# Patient Record
Sex: MALE | Race: WHITE | Employment: FULL TIME | URBAN - METROPOLITAN AREA
[De-identification: names, ages, dates, MRNs, and addresses within clinical notes are randomized per-mention and may not be internally consistent; named-entity substitution may affect disease eponyms.]

---

## 2022-12-21 ENCOUNTER — TELEPHONE (OUTPATIENT)
Dept: BEHAVIORAL HEALTH | Facility: CLINIC | Age: 42
End: 2022-12-21

## 2022-12-23 ENCOUNTER — TELEPHONE (OUTPATIENT)
Dept: BEHAVIORAL HEALTH | Facility: CLINIC | Age: 42
End: 2022-12-23

## 2022-12-23 ENCOUNTER — HOSPITAL ENCOUNTER (OUTPATIENT)
Dept: BEHAVIORAL HEALTH | Facility: CLINIC | Age: 42
Discharge: HOME | End: 2022-12-23
Attending: FAMILY MEDICINE | Admitting: FAMILY MEDICINE
Payer: COMMERCIAL

## 2022-12-23 VITALS — BODY MASS INDEX: 27.2 KG/M2 | WEIGHT: 190 LBS | HEIGHT: 70 IN

## 2022-12-23 DIAGNOSIS — F41.1 GAD (GENERALIZED ANXIETY DISORDER): Primary | ICD-10-CM

## 2022-12-23 PROBLEM — F10.20 ALCOHOL DEPENDENCE (H): Status: ACTIVE | Noted: 2022-09-05

## 2022-12-23 PROBLEM — K85.90 ACUTE PANCREATITIS: Status: ACTIVE | Noted: 2022-03-02

## 2022-12-23 PROCEDURE — H0001 ALCOHOL AND/OR DRUG ASSESS: HCPCS | Mod: GT,95

## 2022-12-23 RX ORDER — TRAZODONE HYDROCHLORIDE 50 MG/1
1 TABLET, FILM COATED ORAL AT BEDTIME
COMMUNITY
Start: 2022-03-04

## 2022-12-23 RX ORDER — HYDROXYZINE HYDROCHLORIDE 10 MG/1
TABLET, FILM COATED ORAL
COMMUNITY
Start: 2022-12-20

## 2022-12-23 RX ORDER — GABAPENTIN 100 MG/1
CAPSULE ORAL
COMMUNITY
Start: 2022-12-20

## 2022-12-23 ASSESSMENT — PAIN SCALES - GENERAL: PAINLEVEL: NO PAIN (0)

## 2022-12-23 ASSESSMENT — COLUMBIA-SUICIDE SEVERITY RATING SCALE - C-SSRS
3. HAVE YOU BEEN THINKING ABOUT HOW YOU MIGHT KILL YOURSELF?: NO
4. HAVE YOU HAD THESE THOUGHTS AND HAD SOME INTENTION OF ACTING ON THEM?: NO
6. HAVE YOU EVER DONE ANYTHING, STARTED TO DO ANYTHING, OR PREPARED TO DO ANYTHING TO END YOUR LIFE?: YES
5. HAVE YOU STARTED TO WORK OUT OR WORKED OUT THE DETAILS OF HOW TO KILL YOURSELF? DO YOU INTEND TO CARRY OUT THIS PLAN?: NO
1. IN THE PAST MONTH, HAVE YOU WISHED YOU WERE DEAD OR WISHED YOU COULD GO TO SLEEP AND NOT WAKE UP?: NO
2. HAVE YOU ACTUALLY HAD ANY THOUGHTS OF KILLING YOURSELF IN THE PAST MONTH?: NO

## 2022-12-23 NOTE — TELEPHONE ENCOUNTER
Patient have a video appointment today at 8am with [facility]. Writer placed a call this morning to check in. Unable to get a hold of patient. Writer's call went to patient's voicemail. Patient's voicemail box is full. Writer could not leave a voicemail behind.

## 2022-12-23 NOTE — TELEPHONE ENCOUNTER
The below telephone note was routed to the [facility] UR department at: [facility]PAT[facility] UR [[id number]] and to the [facility] IOP Admissions Department at: [facility] ADULT IOP INTAKE POOL [[id number]] on 12/23/2022 as a referral for IOP treatment at [facility].    A.)  Name: Nicko Perez Tel #: [phone]  B.)  MRN: [id number]  C.)  Referral is for: a virtual or hybrid Evening Outpatient program at one of the [facility] locations for co-occurring mental health and substance use disorder treatment.  D.)  Notes: Patient has [facility]    Thanks,    ELVIS Echevarria

## 2022-12-23 NOTE — ADDENDUM NOTE
Encounter addended by: Megan Dumont [facility] on: 12/23/2022 9:57 AM   Actions taken: Visit diagnoses modified, Order list changed, Diagnosis association updated

## 2022-12-23 NOTE — PATIENT INSTRUCTIONS
Recommendations:     1. Plan for Safety and Risk Management:  Recommended that patient call 911 or go to the local ED should there be a change in any of these risk factors..      Report to child / adult protection services was NA.     2. STACY Recommendations:  Patient meets criteria for residential treatment, but is unable to complete that level of care at this time due to financial concerns and being able to meet financial payments to enter treatment at this time.  Patient is also wanting to return to work so that his home does not go into [facility].  For these reasons, the following recommendations are being made and patient reports they are willing to follow these recommendations.  Patient would like the following family or other support people involved in their treatment:  None. Patient does not have a history of opiate use.    1)  Complete an Intensive Outpatient CD Treatment Program, by a provider of your choice.   2)  Abstain from all mood-altering chemicals unless prescribed by a licensed provider.    3)  Attend, at minimum, 2 weekly support group meetings, such as 12 step based (AA/NA), SMART Recovery, Health Realizations,  and/or Refuge Recovery meetings.      4)  Actively work with a male mentor/sponsor on a weekly basis.    5)  Follow all the recommendations of your treatment/medical providers.   6)  Remain law abiding and follow all recommendations of the Courts/PO.   7)  Patient may benefit from obtaining a mental health evaluation and connection to individual providers.   8)  Should you struggle with cravings, meet with your Primary Care Physician and discuss anti-craving medication.    3. Mental Health Referrals:  Patient would benefit from a mental health evaluation, and a referral was made to schedule for patient.     4. Clinical Substantiation/medical necessity for the above recommendations:  Met with patient individually on 12/23/22 for a comprehensive assessment including summary of assessment  and conclusion, assessment risk and appropriate steps taken, documentation to support the diagnosis, rationale for disposition and appropriate level of care recommended and alternative for treatment options.  Patient stated he missed some work due to relapsing on alcohol and he was honest with his employer.  Patient said he needs to complete an evaluation and follow the recommendations to return to work.  Patient recently completed a detox at [address] [place] [address] and said he has not had anything to drink since.  Patient has struggled with alcohol use for the past 10 years and has had periods of sobriety, but continues to relapse due to maladaptive coping skills.  Patient has not engaged in mental health services in the past and is interested in a referral for mental health supports as well as sober supportive services.    Rational for recommended level of care: The patient lacks long-term sober maintenance skills, lacks sober coping skills, lacks awareness regarding the disease model of addiction, lacks a sober peer support network and has medical issues which are exacerbated by substance abuse.    Placement/Program Barriers Identified: None identified.    Referral:   [facility] - Intensive Outpatient Program  Mental Health & Addiction Services  Intake Phone: [phone]  Fax: [phone]  Substance Use ([email or web address])    [facility] Evening Outpatient (Virtual) M, W, TH 6:00pm-9:00pm    [facility]patient (Virtual) M, T,W,TH  5:30-7:30pm    [facility] Evening Outpatient (Hybrid, virtual and in-person) M, T, W 5:30-7:30 and Th  5:30-8:30   Co Occurring Day Outpatient at [facility] (M/W/TH 9:30-12:30)   Day Outpatient at [facility] (M/W/TH 9-noon) which is OP taking Medicare*              [facility] (M/T/W 12-2) which is OP taking Medicare*              [facility] (T/TH 12:30-3:30) [facility] accepts Medicare*    5. Patient's identified no cultural considerations for treatment programming.     6.  Recommendations for treatment focus:   Anxiety - See #3 above.  Alcohol / Substance Use - See #2 above.     7.  Interactive Complexity: No

## 2022-12-23 NOTE — PROGRESS NOTES
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 regarding the disease model of addiction, lacks a sober peer support network and has medical issues which are exacerbated by substance abuse.    Placement/Program Barriers Identified: None identified.    Referral:   [facility] - Intensive Outpatient Program  Mental Health & Addiction Services  Intake Phone: [phone]  Fax: [phone]  Substance Use ([email or web address])    [facility] Evening Outpatient (Virtual) M, W, TH 6:00pm-9:00pm    [place] Evening Outpatient (Virtual) M, T,W,TH  5:30-7:30pm    [place] Evening Outpatient (Hybrid, virtual and in-person) M, T, W 5:30-7:30 and Th  5:30-8:30   Co Occurring Day Outpatient at [facility] (M/W/TH 9:30-12:30)   Day Outpatient at [facility] (M/W/TH 9-noon) which is OP taking Medicare*               [facility] (M/T/W 12-2) which is OP taking Medicare*               [facility] (T/TH 12:30-3:30) [facility] accepts Medicare*    5. Patient's identified no cultural considerations for treatment programming.     6. Recommendations for treatment focus:   Anxiety - See #3 above.  Alcohol / Substance Use - See #2 above.     7.  Interactive Complexity: No     DAANES Assessment ID: [id number]    Provider Name/ Credentials:  Megan Dumont MA, [facility] [place]  December 23, 2022

## 2022-12-23 NOTE — TELEPHONE ENCOUNTER
Writer placed a second call at 7:48am to check in patient for video appointment at 8am. Unable to get a hold of patient. Patient's voicemail box is full. Writer could not leave a voicemail behind. Writer will inform patient's .

## 2022-12-23 NOTE — TELEPHONE ENCOUNTER
Nicko Perez Release of Information requests were e-mailed to the [place] [address] OB's at [email or web address] on 12/23/2022 with requests for the following releases:    Patient's e-mail address: [email or web address]    1.) STACY - [id number] - Exchange of MH & STACY Records  [facility]  Marita Liz, [phone]

## 2022-12-29 ENCOUNTER — HOSPITAL ENCOUNTER (OUTPATIENT)
Dept: BEHAVIORAL HEALTH | Facility: CLINIC | Age: 42
Discharge: HOME | End: 2022-12-29
Attending: FAMILY MEDICINE
Payer: COMMERCIAL

## 2022-12-29 PROCEDURE — H2035 A/D TX PROGRAM, PER HOUR: HCPCS | Mod: HQ,GT,95

## 2022-12-29 PROCEDURE — H2035 A/D TX PROGRAM, PER HOUR: HCPCS | Mod: GT,95

## 2022-12-29 ASSESSMENT — ANXIETY QUESTIONNAIRES
IF YOU CHECKED OFF ANY PROBLEMS ON THIS QUESTIONNAIRE, HOW DIFFICULT HAVE THESE PROBLEMS MADE IT FOR YOU TO DO YOUR WORK, TAKE CARE OF THINGS AT HOME, OR GET ALONG WITH OTHER PEOPLE: NOT DIFFICULT AT ALL
5. BEING SO RESTLESS THAT IT IS HARD TO SIT STILL: SEVERAL DAYS
4. TROUBLE RELAXING: NOT AT ALL
6. BECOMING EASILY ANNOYED OR IRRITABLE: NOT AT ALL
2. NOT BEING ABLE TO STOP OR CONTROL WORRYING: SEVERAL DAYS
GAD7 TOTAL SCORE: 4
3. WORRYING TOO MUCH ABOUT DIFFERENT THINGS: NOT AT ALL
1. FEELING NERVOUS, ANXIOUS, OR ON EDGE: MORE THAN HALF THE DAYS
7. FEELING AFRAID AS IF SOMETHING AWFUL MIGHT HAPPEN: NOT AT ALL
GAD7 TOTAL SCORE: 4

## 2022-12-29 NOTE — PROGRESS NOTES
"Client:  Nicko Perez  MRN: [id number]    Comprehensive Assessment UPDATE/[facility]/[place] Re-Assess   Comprehensive Assessment Update: 12/29/2022    Comprehensive assessment dated 12/23/22 was reviewed and updates are as follows: No changes identified    Reason for admission today:  \"Been having difficulty with my drinking, medical professionals were wanting me to quit. Want to find a place I can talk to others and figure out ways to make it to where I cannot drink. Made it 3 months of not drinking, stress and negativity built up and I had a \"Big backslide'\". Works at a [employer]. Missed a few days from work, was honest with employer about his alcohol use. Is unable to return to work until he has entered into a treatment program.    Dates of last use and substance(s) used:  12/19/22  Patient does not have a history of opiate use.    Safety concerns:  Denies       Other:  Denies    Health Screening:  Given patient's past history, a medication, and physical condition, is there a fall risk?  No  Does the patient have any pain? No  Is the patient on a special diet? If yes, please explain: yes Lower fat diet  Does the patient have any concerns regarding your nutritional status? If yes, please explain: no  Has the patient had any appetite changes in the last 3 months?  No  Has the patient had any weight loss or weight gain in the last 3 months? Yes, how much? Lost 30lbs 4 months ago due to live problems.  Has the patient have a history of an eating disorder or been over-eating, avoiding meals, or inducing vomiting?  No  Does the patient have any dental concerns? (Problems with teeth, pain, cavities, braces)?  YES I have a \"Jacked up tooth on my right side\".  Are immunizations up to date?  Yes  Any recent exposure to TB, Hepatitis, Measles, or Strep?  No  Client's BMI is normal.  Client informed of BMI?       Dimension Scale Ratings:    Dimension 1: 0 Client displays full functioning with good ability to tolerate and " cope with withdrawal discomfort. No signs or symptoms of intoxication or withdrawal or resolving signs or symptoms.    Dimension 2: 1 Client tolerates and oskar with physical discomfort and is able to get the services that the client needs.    Dimension 3: 2 Client has difficulty with impulse control and lacks coping skills. Client has thoughts of suicide or harm to others without means; however, the thoughts may interfere with participation in some treatment activities. Client has difficulty functioning in significant life areas. Client has moderate symptoms of emotional, behavioral, or cognitive problems. Client is able to participate in most treatment activities.    Dimension 4: 1 Client is motivated with active reinforcement, to explore treatment and strategies for change, but ambivalent about illness or need for change.    Dimension 5: 4 No awareness of the negative impact of mental health problems or substance abuse. No coping skills to arrest mental health or addiction illnesses, or prevent relapse.    Dimension 6: 3 Client is not engaged in structured, meaningful activity and the client's peers, family, significant other, and living environment are unsupportive, or there is significant criminal justice system involvement.    Initial Service Plan (ISP)    Immediate health, safety, and preliminary service needs identified and plan includes the following based on available information from clients, referral sources, and collateral information.    Safety (SI, SIB, suicide attempts, aggressive behaviors):  Denies    Health:  Client does NOT have health issues that would impede participation in treatment    Transportation: Does not have a license or transportation     Other:  Denies    Patient does not have any identified barriers to participating in referred services.    Vulnerable Adult Assessment    Does the patient possess a physical or mental infirmity or other physical, mental, or emotional dysfunction?  No.  Patient is not a vulnerable adult.    This patient is not a functional Vulnerable Adult according to [place] Statute 626.5572 subdivision 21.      Treatment suggestions for client for the time period until the    initial treatment planning session:  Complete safety plan and goals list    [place] Re-Assessment:     Have you ever wished you were dead or that you could go to sleep and not wake up? Lifetime?  No   Past Month?  No     Have you actually had any thoughts of killing yourself?  Lifetime?  No   Past Month?  No     Have you been thinking about how you might do this? Lifetime?  No   Past Month?  No     Have you had these thoughts and had some intention of acting on them?  Lifetime?  No   Past Month?  No     Have you started to work out the details of how to kill yourself?  Lifetime?  No   Past Month?  No     Do you intend to carry out this plan?   No     When you have the thoughts how long do they last?   N/A    Are there things - anyone or anything (ie Family, Buddhism, pain of death) that stopped you from wanting to die or acting on thoughts of suicide?   Does not apply        2008  [employer]  Used with permission by Vidhya Hudson, PhD.      Dorothy Ulrich, [facility]       12/29/2022 12/29/22

## 2022-12-29 NOTE — TELEPHONE ENCOUNTER
----- Message from ELVIS Cormier sent at 12/29/2022 11:27 AM CST -----  Regarding: Scheduling  Please Schedule    SILKE LA [[id number]]  Location of program: [place]  Start Date: 12/29/22  Time: 3:00 PM - 5:00 PM  Monday, Wednesday, Thursday and 3:00 PM to 6:00 PM on Tuesdays  Provider: STACY MOORE PHASE1 MIXED ([id number])   Number of visits: 32 sessions  Length of appt: 120 minutes on M, W, TH and 180min on Tue.  Visit type:  Zoom   Billing Type: part of program     Thank You!  Dorothy Ulrich

## 2022-12-29 NOTE — TELEPHONE ENCOUNTER
----- Message from ELVIS Cormier sent at 12/29/2022 11:28 AM CST -----  Regarding: Scheduling  Please Schedule    SILKE LA [[id number]]  Location of program: [place]  Date: 1/2/23  Time: 1pm  Provider: STACY MOORE MIXED  Length of appt: 60  Visit type:  Zoom   Billing Type: part of program     Thank You!  Dorothy Ulrich

## 2022-12-29 NOTE — TELEPHONE ENCOUNTER
----- Message from Estella Ye sent at 12/28/2022  3:26 PM CST -----  Regarding: SERVICE INITIATION  SERVICE INITIATION SCHEDULED ON 12/29, [facility]

## 2022-12-30 NOTE — ADDENDUM NOTE
Encounter addended by: Dorothy Ulrich LADC on: 12/29/2022 7:30 PM   Actions taken: Charge Capture section accepted

## 2022-12-30 NOTE — GROUP NOTE
Group Therapy Documentation    PATIENT'S NAME: Nicko Perez  MRN:   [id number]  :   1980  ACCT. NUMBER: [id number]  DATE OF SERVICE: 22  START TIME:  3:00 PM  END TIME:  5:00 PM  FACILITATOR(S): Dorothy Ulrich LADC  TOPIC: BEH Group Therapy  Video Visit:      Provider verified identity through the following two step process.  Patient provided:  Patient is known previously to provider    Telemedicine Visit: The patient's condition can be safely assessed and treated via synchronous audio and visual telemedicine encounter.      Reason for Telemedicine Visit: Patient has requested telehealth visit    Originating Site (Patient Location): Patient's home    Distant Site (Provider Location): Provider Remote Setting- Home Office    Consent:  The patient/guardian has verbally consented to: the potential risks and benefits of telemedicine (video visit) versus in person care; bill my insurance or make self-payment for services provided; and responsibility for payment of non-covered services.     Patient would like the video invitation sent by:  Send to e-mail at: No e-mail address on record    Mode of Communication:  Video Conference via Medical Zoom    Distant Location (Provider):  Off-site    As the provider I attest to compliance with applicable laws and regulations related to telemedicine.    Topic 6:  readiness to change (stages of change)  This topic will give a general overview of stage of change models and how they apply to the personal experience of each patient.    Objective(s):    Patient will identify at least 2 stage of change models.    Patient will identify at least 5 stages within the Prochaska-DiClemente model.    Patient will identify their own position within the model(s).  Structure:    Provide psychoeducation on readiness to change and stages of change.    Facilitate group discussion around each patient's reasons to change and current place in the model(s).    Use teach-back  techniques to ensure patients' understanding.    Provide patients with handouts to enhance learning.  Expected therapeutic outcomes:     Understand change as an essential and non-linear process.    Reduce confusion about ambivalence.    Enhanced motivation to change.  Therapeutic outcome(s) measured by:    Patient's ability to teach-back information learned in group.    Patient's demonstration of learning by identification of their own stage of change.     Number of patients attending the group:  5  Group Length:  2 Hours    Group Therapy Type: Addiction, Life skill(s), and Skills/Education    Summary of Group / Topics Discussed:    Balanced Lifestyle , Coping Skills/Lifestyle Managemet, Choices in Recovery, Mindfulness, and Thinking Errors/Negative Self-Talk    Clients welcomed new peer and answered questions relating to introducing themselves. Clients then participated in mindfulness activity by practicing stroop test, which consists of clients having to think outside of the box to say the color of a word and not the word of the color. We practiced this activity three times so clients could see the improvement the more they practiced it. We also discussed how they practiced the how and what skills of mindfulness to stay in the moment. Client then shared his completed treatment assignment that we ran out of time for during yesterdays group. He received feedback from staff and peers. Clients then moved onto the topic for today, Stages of Change. Clients were introduced to the concept of stages of change, went through and explained each stage as well as shared examples for each. Clients were then read scenarios and had to choose which stage of change each scenarios depicted as well as explain why they picked that stage. Clients then took turns identifying which stage of change they felt they were in and why. We took the last few minutes of group to discuss New years drew plans and what they were going to do to  "decrease risk of use.     Group Attendance:  Attended group session    Patient's response to the group topic/interactions:  discussed personal experience with topic, gave appropriate feedback to peers, unable to interrupt client, verbalizations were off topic and Client appeared to fall asleep at one point.     Patient appeared to be Attentive, Distracted and Passively engaged.        Client specific details:  Client enjoyed the activity, even stated he had fun (which he shared previously, struggling to do:)) Client did struggle to listen/follow directions of the activity, as he was saying the word not the color. Client listened while a fellow peer shared him assignment on \"Perfectionism\". He interrupted client to share his opinion of what client was saying. Counselor informed him of sharing process including waiting till client has completed sharing, then other peers share feedback. Client apologized. After the other client had completed, the group took turns sharing feedback however client not only had his own turn to share, but was also interrupting other peers while they were talking and would start sharing again before others had a chance.  As we started to discuss the stages of change, clients camera was off. Counselor asked him to turn his camera back there was no response. A little later we could hear snoring coming from clients device, he had apparently fallen asleep leading to him not participate in the last 25 min of group and not sharing insight into stages of change or discussing his plans for New Year. This group is focused on goals related to dimensions 3 (mindfulness), and 4 (Stages of change).  .    Plan: Counselor will follow up with client to check-in as well as reiterate some of the group guidelines relating to taking turns and not interrupting.   "

## 2022-12-30 NOTE — ADDENDUM NOTE
Encounter addended by: Dorothy Ulrich LADC on: 12/30/2022 12:32 PM   Actions taken: Charge Capture section accepted

## 2022-12-30 NOTE — PROGRESS NOTES
"  Individual Session Summary   START TIME: 10:00AM   END TIME: 11:15AM   Duration: 75 min    Video Visit:      Provider verified identity through the following two step process.  Patient provided:  Patient was verified at admission/transfer    Telemedicine Visit: The patient's condition can be safely assessed and treated via synchronous audio and visual telemedicine encounter.      Reason for Telemedicine Visit: Patient has requested telehealth visit    Originating Site (Patient Location): Patient's home    Distant Site (Provider Location): [facility] Outpatient Setting: [place]    Consent:  The patient/guardian has verbally consented to: the potential risks and benefits of telemedicine (video visit) versus in person care; bill my insurance or make self-payment for services provided; and responsibility for payment of non-covered services.     Patient would like the video invitation sent by:  Send to e-mail at: [email or web address]    Mode of Communication:  Video Conference via [employer]well    Distant Location (Provider):  On-site    As the provider I attest to compliance with applicable laws and regulations related to telemedicine.    Data:  Met with client on this date for a service initiation appointment. Client agreed to follow any/all rules of the program. He submitted to PHQ-2 with a score of 2, LESLEY-7 with a score of 4 and PROMIS-10 with a score of 30. We discussed his significant other drinking which he claimed \"I dont care if she drinks beer in the house, she just cant have liquor\". He stated if she does bring liquor in the house, he will \"Dump it out again\".   We also discussed clients triggers being: Drama and negativity and not having a quiet place to escape to as he expresses experiencing this at work and when he gets home. Client does not have a car and lives in a \"small house\" with his significant other. He states having a couple places he could go, if he needed to get out of the house. We also " discussed stepping outside and practicing breathing exercise. We practice box breathing together and client agreed that would be helpful and that he would be willing to try it.     Intervention: Q and A, mental health assessments, MI, DBT, client centered.     Assessment: Client had his girlfriend in the room with him when we started the session as well as light a cigarette. He was informed of the expectations of virtual meetings including, no smoking and having to be in a private area where no one will interrupt or be able to hear what is shared. He agreed to both of these. Client does not appear to enjoy socializing and tends to repeat himself.      Plan. Client will complete intake paperwork, goals list, and safety plan, he will start group at 3pm today, and will practicing box breathing if/when he is becomes overwhelmed.    Dorothy Ulrich, NATASHA, LADC
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
## 2022-12-31 NOTE — ADDENDUM NOTE
Encounter addended by: Dorothy Ulrich LADC on: 12/31/2022 4:12 PM   Actions taken: Pend clinical note, Clinical Note Signed
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
## 2023-01-02 ENCOUNTER — HOSPITAL ENCOUNTER (OUTPATIENT)
Dept: BEHAVIORAL HEALTH | Facility: CLINIC | Age: 43
Discharge: HOME | End: 2023-01-02
Attending: FAMILY MEDICINE
Payer: COMMERCIAL

## 2023-01-02 PROCEDURE — H2035 A/D TX PROGRAM, PER HOUR: HCPCS | Mod: GT,95

## 2023-01-03 ENCOUNTER — HOSPITAL ENCOUNTER (OUTPATIENT)
Dept: BEHAVIORAL HEALTH | Facility: CLINIC | Age: 43
Discharge: HOME | End: 2023-01-03
Attending: FAMILY MEDICINE
Payer: COMMERCIAL

## 2023-01-03 NOTE — GROUP NOTE
Group Therapy Documentation    PATIENT'S NAME: Nicko Perez  MRN:   [id number]  :   1980  ACCT. NUMBER: [id number]  DATE OF SERVICE: 23  START TIME:  3:00 PM  END TIME:  6:00 PM  FACILITATOR(S): Maylin Guerrero LICSW  TOPIC: BEH Group Therapy  Number of patients attending the group:  5  Group Length:  3 Hours    Group Therapy Type: Addiction    Summary of Group / Topics Discussed:    Psychoeducation/Skills How Neurobiology Affects Behavior (IOP)  This topic will give a general overview of the neurobiology of addiction with the purpose of teaching new brain-based coping strategies to reduce the impact of their cravings, urges and distorted memories.  The clients will learn how the midbrain uses memory and associations to create cravings and how to counteract these.     Objective(s):    Clients will identify 3 ways to calm their overactive midbrain and strengthen their frontal cortex to lessen the impact of cravings and urges for addictive substances.      Clients will identify the underlying mechanisms at work to help them detach from the thoughts and emotions that trigger using.     Describe the reward pathway involved in addiction    Identify 1 skill  to counteract cravings and urges to use substances    Structure (modalities, homework, worksheets, etc.):    History of the study of addiction as a disease (PowerPoint)    Psychoeducation on the areas of the midbrain involved in addiction and how the various parts of the brain communicate with each other (PowerPoint)    Psychoeducation and discussion on how the escalation of addiction manifests in personal behaviors leading to negative consequences     Hands on practice of evidenced based strategies to calm the midbrain     Hands on practice of evidenced based strategies to strengthen the frontal cortex    Psychoeducation of anatomy of cravings, urges and addictive thinking and how to counteract this pattern     Show video clip of cross-addictions &  process addictions    Expected therapeutic outcome(s):     A reduction in shame and guilt due to understanding how addiction influences behavior.    Reduction of use episodes due to using skills to reduce cravings.    Therapeutic outcome(s) measured by:   Teachback and group review and evaluation form    Video Visit:      Provider verified identity through the following two step process.  Patient provided:  Patient was verified at admission/transfer by date of birth and photo in Electronic Record    Telemedicine Visit: The patient's condition can be safely assessed and treated via synchronous audio and visual telemedicine encounter.      Reason for Telemedicine Visit: Services only offered telehealth    Originating Site (Patient Location): Patient's home    Distant Site (Provider Location): Provider Remote Setting - Home Office     Consent:  The patient/guardian has verbally consented to: the potential risks and benefits of telemedicine (video visit) versus in person care; bill my insurance or make self-payment for services provided; and responsibility for payment of non-covered services.     Patient would like the video invitation sent by:  Other e-mail: [email or web address]    Mode of Communication:  Video Conference via Medical Zoom    Distant Location (Provider):  Off-site    As the provider I attest to compliance with applicable laws and regulations related to telemedicine.    Group Attendance:  Attended group session; Nicko attended the group until 3:53 PM, this was not billed, as he appeared to be getting intoxicated during the session and then a bit belligerant and I had to ask him to leave so as not to trigger the other group members. He denied being intoxicated but also kept repeating himself and it devolved into some nonsensical responses. I notified his primary counselor.     Patient's response to the group topic/interactions:  verbalizations were off topic    Patient appeared to be intoxicated as he  "stayed in the session, had to be removed, see above for \"Group Attendance\" for details.        Client details: See \"Group Attendance\" for details, this session was not billed. Informed his primary counselor and will contact her tomorrow.     "

## 2023-01-03 NOTE — PROGRESS NOTES
Individual Session Summary   START TIME: 1:03PM   END TIME: 2:00PM   Duration: 57 min    Video Visit:      Provider verified identity through the following two step process.  Patient provided:  Patient is known previously to provider    Telemedicine Visit: The patient's condition can be safely assessed and treated via synchronous audio and visual telemedicine encounter.      Reason for Telemedicine Visit: Patient convenience (e.g. access to timely appointments / distance to available provider)    Originating Site (Patient Location): Patient's home    Distant Site (Provider Location): [facility] Outpatient Setting: [place]    Consent:  The patient/guardian has verbally consented to: the potential risks and benefits of telemedicine (video visit) versus in person care; bill my insurance or make self-payment for services provided; and responsibility for payment of non-covered services.     Patient would like the video invitation sent by:  Send to e-mail at: [email or web address]    Mode of Communication:  Video Conference via Medical Zoom    Distant Location (Provider):  On-site    As the provider I attest to compliance with applicable laws and regulations related to telemedicine.    Data:  Met with client on this date for an individual session to develop clients Treatment plan. He expressed having difficulties filling out his goals list prior to the appointment. We took time for client to identify his goals prior to starting his treatment plan. Client participated well and appeared open and honest about things he wanted to work on. Client expressed his biggest struggle is his anger. Counselor suggested multiple options client can use to cope with his anger. He agreed that box breathing would be helpful as well as learning to be more assertive. Counselor found an assignment for client to practice assertive statements. He agreed to any/all goals/strategies outlined in hi treatment plan.     Intervention: Q and  A, Treatment assignments, DBT, MI     Assessment: Client appeared honest and open relating to character defects he would like to address while in treatment.      Plan. Client will sign Treatment plan, complete assignments relating to assertive communication, and practice box breathing at first sign of anger.    Dorothy Ulrich, NATASHA, [facility]

## 2023-01-04 ENCOUNTER — HOSPITAL ENCOUNTER (OUTPATIENT)
Dept: BEHAVIORAL HEALTH | Facility: CLINIC | Age: 43
Discharge: HOME | End: 2023-01-04
Attending: FAMILY MEDICINE
Payer: COMMERCIAL

## 2023-01-04 DIAGNOSIS — F10.20 ALCOHOL DEPENDENCE, UNCOMPLICATED (H): Primary | ICD-10-CM

## 2023-01-04 PROCEDURE — H2035 A/D TX PROGRAM, PER HOUR: HCPCS | Mod: HQ,GT,95
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
## 2023-01-05 ENCOUNTER — HOSPITAL ENCOUNTER (OUTPATIENT)
Dept: BEHAVIORAL HEALTH | Facility: CLINIC | Age: 43
Discharge: HOME | End: 2023-01-05
Attending: FAMILY MEDICINE
Payer: COMMERCIAL

## 2023-01-05 PROCEDURE — H2035 A/D TX PROGRAM, PER HOUR: HCPCS | Mod: HQ,GT,95
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
## 2023-01-05 NOTE — ADDENDUM NOTE
Encounter addended by: Dorothy Ulrich LADC on: 1/5/2023 12:26 PM   Actions taken: Charge Capture section accepted

## 2023-01-06 NOTE — GROUP NOTE
Group Therapy Documentation    PATIENT'S NAME: Nicko Perez  MRN:   [id number]  :   1980  ACCT. NUMBER: [id number]  DATE OF SERVICE: 23  START TIME:  3:00 PM  END TIME:  5:00 PM  FACILITATOR(S): Dorothy Ulrich LADC  TOPIC: BEH Group Therapy  Video Visit:      Provider verified identity through the following two step process.  Patient provided:  Patient is known previously to provider    Telemedicine Visit: The patient's condition can be safely assessed and treated via synchronous audio and visual telemedicine encounter.      Reason for Telemedicine Visit: Patient has requested telehealth visit    Originating Site (Patient Location): Patient's home    Distant Site (Provider Location): [facility] Outpatient Setting: [place]    Consent:  The patient/guardian has verbally consented to: the potential risks and benefits of telemedicine (video visit) versus in person care; bill my insurance or make self-payment for services provided; and responsibility for payment of non-covered services.     Patient would like the video invitation sent by:  Send to e-mail at: No e-mail address on record    Mode of Communication:  Video Conference via Medical Zoom    Distant Location (Provider):  On-site    As the provider I attest to compliance with applicable laws and regulations related to telemedicine.    Number of patients attending the group:  5  Group Length:  2 Hours    Group Therapy Type: Addiction, Psychoeducation, and Skills/Education    Summary of Group / Topics Discussed:    Coping Skills/Lifestyle Managemet, Choices in Recovery, Mindfulness, Proper Nutrition & Exercise, and Thinking Errors/Negative Self-Talk    Clients participated in 10 min mindful stretching activity, answered check in questions, discussed introduction of Pleasure Unwoven, Watched parts of movie and discussed thoughts and feedback. Clients concluded group discussing their opinion on addiction being a choice or disease and  explained their answer.     Topic 2:  Neurobiology    This topic will give a general overview of the brain chemistry involved with addiction.  Objective(s):    Client will identify at least 2 primary neurotransmitters involved with addiction.    Client will identify basic brain regions involved with addiction.    Client will identify the relationship of brain chemistry to post-acute withdrawal symptoms.  Structure:    Provide psychoeducation on neurotransmitters and brain regions involved in addiction to illustrate how frontal cortex executive functioning is diminished by substance use disorder.    Provide psychoeducation on how biochemical imbalances resulting from substance use may lead to PAWS.    Facilitate group discussion around each patient's current beliefs of whether addiction is a disease or a moral failing and how that may have changed.    Facilitate group discussion around each patient's current symptomology.    Use teach-back techniques to ensure patients' understanding.    Provide patients with handouts to enhance learning.  Expected therapeutic outcomes:     Better understand the disease concept of addiction.    Reduce confusion about post-acute withdrawal symptoms to better manage them.  Therapeutic outcome(s) measured by:    Patient's ability to teach-back information learned in group.    Patient's demonstration of learning by completion of post-quiz.      Group Attendance:  Attended group session    Patient's response to the group topic/interactions:  cooperative with task, expressed understanding of topic, gave appropriate feedback to peers and listened actively    Patient appeared to be Attentive and Engaged.        Client specific details:  Client passively participated in the stretching exercise/activity, stating he is still not feeling the best. He did share it was helpful to decompress his muscles and stay in the moment. Client shared the following check in answers: No use episodes.  "Feelings:Tired due to being sick and \"didn't sleep at all last night\". He also states feeling content. One thing he has learned since being in treatment: \"I have a problem with speech empediment. One thing he enjoys about group is: Listenign to other people and having something to do. One thing he is struggling to accept: Whether he wants to stay in his relationship. Client expressed feeling depressed due to his significant other \"Always nagging on him\". He stated that is the reason he did not get any sleep. This new information led to multiple discrepencies with information he already shared. Counselor expressed wanting to discuss this situation more during an individual session, which client agreed to.Clients were given an introduction to the movie \"Pleasure Unwoven\" and asked whether they felt addiction was a disease or a choice. Client stated it is a disease. After watching parts of the movie and discussing the choice vs disease theory as a group, clients were asked again if they felt addiction is a disease or choice and why? Client elaborated stating at first, it is our choice to try a substance and a disease once our substance use hijacks our brain. Fellow peers verbalized agreement with clients statement.  This group focused on goals for dimension 1 and 5 (education on addiction, intoxication, and cravings) and dimension 3 (mindfulness stretching).     Assessment: Client appeared more attentive, more coherent, less slurring/speech difficulty, and more engaged today.      Plan: Client will use information learned today to help him better understand cravings and PAWS and how substance use rewires or hijacks the brain. Counselor will also reach out to client to schedule individual session for the beginning of next week to discuss relationship issues/feelings.       "

## 2023-01-06 NOTE — ADDENDUM NOTE
Encounter addended by: Dorothy Ulrich LADC on: 1/6/2023 2:02 PM   Actions taken: Charge Capture section accepted

## 2023-01-09 ENCOUNTER — HOSPITAL ENCOUNTER (OUTPATIENT)
Dept: BEHAVIORAL HEALTH | Facility: CLINIC | Age: 43
Discharge: HOME | End: 2023-01-09
Attending: FAMILY MEDICINE
Payer: COMMERCIAL

## 2023-01-09 ENCOUNTER — NURSE TRIAGE (OUTPATIENT)
Dept: NURSING | Facility: CLINIC | Age: 43
End: 2023-01-09

## 2023-01-09 ENCOUNTER — VIRTUAL VISIT (OUTPATIENT)
Dept: PSYCHIATRY | Facility: CLINIC | Age: 43
End: 2023-01-09
Payer: COMMERCIAL

## 2023-01-09 DIAGNOSIS — F10.20 ALCOHOL USE DISORDER, SEVERE, DEPENDENCE (H): Primary | ICD-10-CM

## 2023-01-09 PROCEDURE — H2035 A/D TX PROGRAM, PER HOUR: HCPCS | Mod: HQ,GT,95

## 2023-01-09 PROCEDURE — H2035 A/D TX PROGRAM, PER HOUR: HCPCS | Mod: GT,95

## 2023-01-09 PROCEDURE — 99204 OFFICE O/P NEW MOD 45 MIN: CPT | Mod: GT | Performed by: NURSE PRACTITIONER

## 2023-01-09 NOTE — TELEPHONE ENCOUNTER
Nurse Triage SBAR    Situation:   -Needs drug screen    Background:   -Patein calling, It is okay to leave a detailed message at this number.     Assessment:   -Outpatient program wants him to do a UA/drug screen  -[facility], but it was ordered on 1/4/23    Recommendation:   Warm transferred to scheduling to make lab appointment    NORTH NAZARIO RN on 1/9/2023 at 5:20 PM           Reason for Disposition    Health Information question, no triage required and triager able to answer question    Protocols used: INFORMATION ONLY CALL - NO TRIAGE-A-

## 2023-01-09 NOTE — Clinical Note
Erick Guzman, Thank you for the Psychiatry referral to the [facility] ([facility]). Our psychiatry providers act as a specialty service for Primary Care Providers in the [facility] who seek to optimize medications for unstable patients.  Once medications have been optimized, our providers discharge the patient back to the referring Primary Care Provider for ongoing medication management.  This type of system allows our providers to serve a high volume of patients.   I did meet with this patient today, however as his PCP is outside [facility], he is not appropriate for [facility]. I did place a referral for Addiction Medicine who may be able to support him with additional medication as appropriate. I did also offer a referral for an individual therapist (as your referral stated) and pt declined. If you have any questions or concerns, please let me know. Thank you!  SHADE Sosa, PMONEYDA-BC [facility] ([facility]) [facility]

## 2023-01-09 NOTE — PATIENT INSTRUCTIONS
**For crisis resources, please see the information at the end of this document**     Thank you for coming to the [facility] [place] [facility].    TREATMENT PLAN:  Continue all medications per primary care provider. No medication changes made today.  Continue in current IOP.  Referral made to Addiction Medicine. [facility] will call you to coordinate your care as prescribed by your provider. If you don't hear from a representative within 2 business days, please call [phone].  Follow up with primary care provider as planned or sooner if needed for acute medical concerns.  Call the psychiatric nurse line with medication questions or concerns at [phone].  Fanminderhart may be used to communicate with your provider, but this is not intended to be used for emergencies.      Financial Assistance [phone]  [facility] Billing [phone]  Central Billing Office, [facility]: [phone]  [facility] Billing [phone]  Medical Records [phone]  [facility] Patient Bill of Rights [email or web address]       MENTAL HEALTH CRISIS RESOURCES:  For a emergency help, please call 911 or go to the nearest Emergency Department.     Emergency Walk-In Options:   EmPATH Unit @ [facility] ([place]): [phone] - Specialized mental health emergency area designed to be calming  [facility] ([place]): [phone]  [facility] ([place]): [phone]  [facility]): [phone]    County Crisis Information:   [place]: [phone]  [place]: [phone]  [place] (RADHA) - Adult: [phone]     Child: [phone]  [place] - Adult: [phone]     Child: [phone]  [place]: [phone]  List of all [facility] resources:   https://[place].gov/dhs/people-we-serve/adults/health-care/mental-health/resources/crisis-contacts.jsp    National Crisis Information:    National Suicide & Crisis Lifeline: Call 988        For online chat options, visit https://suicidepreventionlifeline.org/chat/  Poison Control Center: 6-322-441-2007  Poison Control Center: 0-704-664-8979  Trans Lifeline: 7-410-781-5431 - Hotline for transgender people of all ages  The [employer]: 8-369-184-0453 - Hotline for LGBT youth     For Non-Emergency Support:   Fast Tracker: Mental Health & Substance Use Disorder Resources -   https://www.[employer].comn.org/       Again thank you for choosing [facility] [place] [facility] and please let us know how we can best partner with you to improve you and your family's health.    You may be receiving a survey regarding this appointment. We would love to have your feedback, both positive and negative. The survey is done by an external company, so your answers are anonymous.

## 2023-01-09 NOTE — TELEPHONE ENCOUNTER
----- Message from ELVIS Cormier sent at 1/9/2023 10:04 AM CST -----  Regarding: Scheduling  Please Schedule    SILKE LA [[id number]]  Location of program: [place]  Date: 1/9/23  Time: 11:30am  Provider: STACY MOORE MIXED  Length of appt: 60  Visit type:  Zoom   Billing Type: part of program     Thank You!  Dorothy Ulrich
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
## 2023-01-10 ENCOUNTER — LAB (OUTPATIENT)
Dept: LAB | Facility: CLINIC | Age: 43
End: 2023-01-10
Payer: COMMERCIAL

## 2023-01-10 ENCOUNTER — HOSPITAL ENCOUNTER (OUTPATIENT)
Dept: BEHAVIORAL HEALTH | Facility: CLINIC | Age: 43
Discharge: HOME | End: 2023-01-10
Attending: FAMILY MEDICINE
Payer: COMMERCIAL

## 2023-01-10 DIAGNOSIS — F10.20 ALCOHOL DEPENDENCE, UNCOMPLICATED (H): ICD-10-CM

## 2023-01-10 LAB
AMPHETAMINES UR QL SCN: NORMAL
BARBITURATES UR QL SCN: NORMAL
BENZODIAZ UR QL SCN: NORMAL
BZE UR QL SCN: NORMAL
CANNABINOIDS UR QL SCN: NORMAL
ETHANOL UR QL SCN: NORMAL
OPIATES UR QL SCN: NORMAL
PCP QUAL URINE (ROCHE): NORMAL

## 2023-01-10 PROCEDURE — 80307 DRUG TEST PRSMV CHEM ANLYZR: CPT | Mod: 90

## 2023-01-10 PROCEDURE — H2035 A/D TX PROGRAM, PER HOUR: HCPCS | Mod: HQ,GT,95 | Performed by: SOCIAL WORKER

## 2023-01-10 PROCEDURE — 99000 SPECIMEN HANDLING OFFICE-LAB: CPT

## 2023-01-10 NOTE — ADDENDUM NOTE
Encounter addended by: Dorothy Ulrich LADC on: 1/10/2023 5:21 PM   Actions taken: Charge Capture section accepted

## 2023-01-10 NOTE — GROUP NOTE
Group Therapy Documentation    PATIENT'S NAME: Nicko Perez  MRN:   [id number]  :   1980  ACCT. NUMBER: [id number]  DATE OF SERVICE: 1/10/23  START TIME:  3:00 PM  END TIME:  6:00 PM  FACILITATOR(S): Maylin Guerrero LICSW  TOPIC: BEH Group Therapy  Number of patients attending the group:  5  Group Length:  3 Hours    Group Therapy Type: Addiction    Summary of Group / Topics Discussed:    Psychoeducation/Skills Mindfulness in Action (IOP)  Learn what mindfulness is and how to practice it. Learn how to increase awareness of the present moment, thereby reducing ruminating thoughts and learn how to embrace negative emotions instead of suppressing or avoiding them. Consistent practice has been shown to decrease reactivity and help facilitate effective action to make positive changes in behavior.     Objective(s):    Identify 2 skills to increase awareness of the present moment to reduce negative impact of ruminating thoughts    Describe how to embrace negative emotions instead of suppressing or avoiding them.    Give 1 example of how mindfulness works in the brain to calm the Survival part of the Midbrain (which is overactive in addiction)    Give1 example of how mindfulness practice may decrease reactivity and facilitate effective action    Structure (modalities, homework, worksheets, etc.):    Complete  before and after  worksheet for mindfulness practice ([place] Exercise)    Participate in Awareness of Sounds meditation    Participate in Mindful Eating meditation    Complete Mindful Listening exercise with other group member(s) (Dyads or Triads)    Participate in Sitting Meditation     Expected therapeutic outcome(s):     Increased ability to remain in present moment    Increased ability to tolerate negative emotions without returning to addiction    Therapeutic outcome(s) measured by:   Teachback and group review and evaluation form.    Video Visit:        Provider verified identity through the  "following two step process.  Patient provided:  Patient is known previously to provider and Patient was verified at admission/transfer     Telemedicine Visit: The patient's condition can be safely assessed and treated via synchronous audio and visual telemedicine encounter.      Reason for Telemedicine Visit: Services only offered telehealth.    Originating Site (Patient Location): Patient's home    Distant Site (Provider Location):  Provider Remote Setting - Home Office     Consent:  The patient/guardian has verbally consented to: the potential risks and benefits of telemedicine (video visit) versus in person care; bill my insurance or make self-payment for services provided; and responsibility for payment of non-covered services.     Patient would like the video invitation sent by:  Other e-mail: [email or web address]    Mode of Communication:  Video Conference via Medical Zoom    Distant Location (Provider):  Off-site    As the provider I attest to compliance with applicable laws and regulations related to telemedicine.    Group Attendance:  Attended group session    Patient's response to the group topic/interactions:  expressed readiness to alter behaviors and expressed understanding of topic    Patient appeared to be Attentive and Engaged.        Client specific details:  Darrian participated fully in this session. He was more alert and engaged today than last week and shared insightful comments with the group. He had his UA today and said he felt better getting this done. His stress is 6-7 \"I just have a lot on my plate\" and he spoke of a relationship with someone that was very negative and it's hard for him to \"disconnect myself from it.\" He said he hasn't had cravings this week. Darrian said the Mindful Listening exercise was \"Really good, I liked to sit back and just listen and not talk. I really enjoyed getting to know him better.\" His [place] exercise revolved around not working at this time and page one reflected " "\"being restless, an unsettling feeling, less energy, used to being on my feet, I feel in limbo, anxious about when I can get back to work\" while page two showed more of the same except he added, \"I feel more positive today, and have more energy too. I feel better getting the UA done.\"   This topic completes one of the six MH Skills Groups required under D3 of his Treatment Plan.           "
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
## 2023-01-11 ENCOUNTER — HOSPITAL ENCOUNTER (OUTPATIENT)
Dept: BEHAVIORAL HEALTH | Facility: CLINIC | Age: 43
Discharge: HOME | End: 2023-01-11
Attending: FAMILY MEDICINE
Payer: COMMERCIAL

## 2023-01-11 LAB — ETHYL GLUCURONIDE UR QL SCN: NEGATIVE NG/ML

## 2023-01-11 PROCEDURE — H2035 A/D TX PROGRAM, PER HOUR: HCPCS | Mod: HQ,GT,95

## 2023-01-12 ENCOUNTER — HOSPITAL ENCOUNTER (OUTPATIENT)
Dept: BEHAVIORAL HEALTH | Facility: CLINIC | Age: 43
Discharge: HOME | End: 2023-01-12
Attending: FAMILY MEDICINE
Payer: COMMERCIAL

## 2023-01-12 PROCEDURE — H2035 A/D TX PROGRAM, PER HOUR: HCPCS | Mod: HQ,GT,95
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
## 2023-01-12 NOTE — ADDENDUM NOTE
Encounter addended by: Dorothy Ulrich LADC on: 1/12/2023 1:52 PM   Actions taken: Charge Capture section accepted
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
## 2023-01-13 NOTE — ADDENDUM NOTE
Encounter addended by: Dorothy Ulrich LADC on: 1/13/2023 10:58 AM   Actions taken: Charge Capture section accepted

## 2023-01-16 ENCOUNTER — HOSPITAL ENCOUNTER (OUTPATIENT)
Dept: BEHAVIORAL HEALTH | Facility: CLINIC | Age: 43
Discharge: HOME | End: 2023-01-16
Attending: FAMILY MEDICINE
Payer: COMMERCIAL

## 2023-01-16 PROCEDURE — H2035 A/D TX PROGRAM, PER HOUR: HCPCS | Mod: HQ,GT,95

## 2023-01-17 ENCOUNTER — HOSPITAL ENCOUNTER (OUTPATIENT)
Dept: BEHAVIORAL HEALTH | Facility: CLINIC | Age: 43
Discharge: HOME | End: 2023-01-17
Attending: FAMILY MEDICINE
Payer: COMMERCIAL

## 2023-01-17 PROCEDURE — H2035 A/D TX PROGRAM, PER HOUR: HCPCS | Mod: HQ,GT,95 | Performed by: SOCIAL WORKER

## 2023-01-17 NOTE — ADDENDUM NOTE
Encounter addended by: Dorothy Ulrich LADC on: 1/17/2023 10:00 AM   Actions taken: Charge Capture section accepted

## 2023-01-17 NOTE — GROUP NOTE
Group Therapy Documentation    PATIENT'S NAME: Nicko Perez  MRN:   [id number]  :   1980  ACCT. NUMBER: [id number]  DATE OF SERVICE: 23  START TIME:  3:00 PM  END TIME:  5:00 PM  FACILITATOR(S): Dorothy Ulrich LADC  TOPIC: BEH Group Therapy  Video Visit:      Provider verified identity through the following two step process.  Patient provided:  Patient is known previously to provider    Telemedicine Visit: The patient's condition can be safely assessed and treated via synchronous audio and visual telemedicine encounter.      Reason for Telemedicine Visit: Patient has requested telehealth visit    Originating Site (Patient Location): Patient's home    Distant Site (Provider Location): [facility] Setting: [place]    Consent:  The patient/guardian has verbally consented to: the potential risks and benefits of telemedicine (video visit) versus in person care; bill my insurance or make self-payment for services provided; and responsibility for payment of non-covered services.     Patient would like the video invitation sent by:  Send to e-mail at: No e-mail address on record    Mode of Communication:  Video Conference via Medical Zoom    Distant Location (Provider):  On-site    As the provider I attest to compliance with applicable laws and regulations related to telemedicine.    Number of patients attending the group:  7  Group Length:  2 Hours    Group Therapy Type: Addiction and Life skill(s)    Summary of Group / Topics Discussed:    Communication, Coping Skills/Lifestyle Managemet, Choices in Recovery, Leisure Exploration/Use of Leisure Time, Mindfulness, Proper Nutrition & Exercise, Self-Care Activities, and Sleep Hygiene    Clients participated in introduction questions to help introduce themself to new client and vice versa. Clients participated in 10 minute mindfulness activity relating to challenging clients observation skills by seeing multiple sets of popular  "franchise logos, with one logo displaying slight changes, to see if they can identify which one is correct Afterwards, clients reflected on their experience. Clients then took turns answering check in questions relating to updates, progress, and concerns relating to each of the 6 dimensions.       Group Attendance:  Attended group session    Patient's response to the group topic/interactions:  cooperative with task, discussed personal experience with topic and listened actively    Patient appeared to be Actively participating, Attentive and Engaged.        Client specific details:  Client participated in welcoming new client, sharing information about himself and listening to new clients information.  Client stated he thought it was \"Alright\" adding that it did keep his mind occupied. He shared the following check in answers: No change is LDU. Endorses the following biomedical issues/concerns:Increased appetite \"Cant get full\". He denies any medical appointments this week.  He rates the following on a scale of 0-10 (10 being highest), Depression 2,  Anxiety 2-3 . Client expresses experiencing the following mental health symptoms: Feeling more motivated and having lucid dreams \"Almost every night\". He expresses coping by: Getting regular sleep and eating healthier. He rated his Motivation at an 7-8 out of 10 (10 being highest). Client rates cravings at 1-2 out of 10 (10 being highest). He reported triggers being: Girlfriend drinking adding she said she was going to be moving out which client verbalizes, would be a good think for him. He coped by going for walks and keeping himself busy. Client did not attend a self help meeting this past week.  He participated in the following recreational activities:Cooking, cleaning, and going on walks.  No Concerns reported at this time. Client listened respectfully to others peers while they discussed their check in answers. This group focused on all 6 dimensions as clients " discussed progress, updates, and concerns relating to all dimensions. Also dimension 3 for mindfulness activity.    Plan: Client will work on treatment assignment to present in group on Wed.

## 2023-01-18 ENCOUNTER — HOSPITAL ENCOUNTER (OUTPATIENT)
Dept: BEHAVIORAL HEALTH | Facility: CLINIC | Age: 43
Discharge: HOME | End: 2023-01-18
Attending: FAMILY MEDICINE
Payer: COMMERCIAL

## 2023-01-18 PROCEDURE — H2035 A/D TX PROGRAM, PER HOUR: HCPCS | Mod: HQ,GT,95
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
## 2023-01-18 NOTE — GROUP NOTE
Group Therapy Documentation    PATIENT'S NAME: Nicko Perez  MRN:   [id number]  :   1980  ACCT. NUMBER: [id number]  DATE OF SERVICE: 23  START TIME:  3:00 PM  END TIME:  5:00 PM  FACILITATOR(S): Dorothy Ulrich LADC  TOPIC: BEH Group Therapy  Video Visit:      Provider verified identity through the following two step process.  Patient provided:  Patient is known previously to provider    Telemedicine Visit: The patient's condition can be safely assessed and treated via synchronous audio and visual telemedicine encounter.      Reason for Telemedicine Visit: Patient has requested telehealth visit    Originating Site (Patient Location): Patient's home    Distant Site (Provider Location): [facility] Outpatient Setting: [place]    Consent:  The patient/guardian has verbally consented to: the potential risks and benefits of telemedicine (video visit) versus in person care; bill my insurance or make self-payment for services provided; and responsibility for payment of non-covered services.     Patient would like the video invitation sent by:  Send to e-mail at: No e-mail address on record    Mode of Communication:  Video Conference via Medical Zoom    Distant Location (Provider):  On-site    As the provider I attest to compliance with applicable laws and regulations related to telemedicine.   Number of patients attending the group:  7  Group Length:  2 Hours    Group Therapy Type: Addiction and Skills/Education    Summary of Group / Topics Discussed:    Forgiveness, Leisure Exploration/Use of Leisure Time, Meditation/Breathing Exercises, and Mindfulness    Clients participated in a 10 minute guided meditation and reflected on their experience, they answered daily check in questions and discussed any concerns they may have and then shared any completed treatment assignments as well well as shared feedback.       Group Attendance:  Attended group session    Patient's response to the group  "topic/interactions:  cooperative with task, expressed understanding of topic, gave appropriate feedback to peers, listened actively and offered helpful suggestions to peers    Patient appeared to be Actively participating, Attentive and Engaged.        Client specific details:  Client shared the following feedback relating to meditation activity: \"It was really relaxing\" He enjoyed the sound of the piano. Client shared the following answers to check-in questions: No use episodes, two feelings: Irritated expressing \"getting the run around\" between work and HR stating as he would like to return to work and feeling \"Good\" that his work wants him back.  Two affirmations: \"I am at peace and I feel accomplished\". One goal for the next 24 hours: Clean out my fridge. Any concerns: No. Client listened to peers concerns and shared supportive and helpful feedback. He also listened while peers share completed treatment assignments and verbalized positive and encouraging feedback. This group focused on dimension 3 (guided meditation) and 4 (sharing completed treatment assignments).      Plan: Client will follow thru with his goal of cleaning out his fridge as well as be ready to share a completed treatment assignment during group next week.      "

## 2023-01-19 ENCOUNTER — HOSPITAL ENCOUNTER (OUTPATIENT)
Dept: BEHAVIORAL HEALTH | Facility: CLINIC | Age: 43
Discharge: HOME | End: 2023-01-19
Attending: FAMILY MEDICINE
Payer: COMMERCIAL

## 2023-01-19 PROCEDURE — H2035 A/D TX PROGRAM, PER HOUR: HCPCS | Mod: HQ,GT,95

## 2023-01-19 NOTE — ADDENDUM NOTE
Encounter addended by: Dorothy Ulrich LADC on: 1/19/2023 11:03 AM   Actions taken: Charge Capture section accepted

## 2023-01-20 NOTE — GROUP NOTE
Group Therapy Documentation    PATIENT'S NAME: Nicko Perez  MRN:   [id number]  :   1980  ACCT. NUMBER: [id number]  DATE OF SERVICE: 23  START TIME:  3:00 PM  END TIME:  5:00 PM  FACILITATOR(S): Dorothy Ulrich LADC  TOPIC: BEH Group Therapy  Video Visit:      Provider verified identity through the following two step process.  Patient provided:  Patient is known previously to provider    Telemedicine Visit: The patient's condition can be safely assessed and treated via synchronous audio and visual telemedicine encounter.      Reason for Telemedicine Visit: Patient has requested telehealth visit    Originating Site (Patient Location): Patient's home    Distant Site (Provider Location): Provider Remote Setting- Home Office    Consent:  The patient/guardian has verbally consented to: the potential risks and benefits of telemedicine (video visit) versus in person care; bill my insurance or make self-payment for services provided; and responsibility for payment of non-covered services.     Patient would like the video invitation sent by:  Send to e-mail at: No e-mail address on record    Mode of Communication:  Video Conference via Medical Zoom    Distant Location (Provider):  Off-site    As the provider I attest to compliance with applicable laws and regulations related to telemedicine.     Number of patients attending the group:  8  Group Length:  2 Hours    Group Therapy Type: Addiction, Life skill(s), and Skills/Education    Summary of Group / Topics Discussed:    Coping Skills/Lifestyle Managemet, Choices in Recovery, Meditation/Breathing Exercises, Proper Nutrition & Exercise, and Sleep Hygiene    Clients answered questions to introduce themself to a fellow peer as well as listened to new peer introduce himself to the group. Clients participated in box breathing activity and shared feedback. Clients participated in group discussion on Stages of Relapse, shared feedback, personal early warning  "signs, and coping skills they could use to help cope with these warning signs.      STAGES OF RELAPSE     This topic will give a general overview of stages of Relapse and how they apply to the personal experience of each patient.     Objective(s):       Patient will identify all three stages of relapse        Patient will identify at least one example of early warning signs relating to each stage.        Patient will identify heathier strategies to cope with each stage of relapse.      Structure:       Provide psychoeducation on Stages of Relapse       Utilize white board for visual aid learning       Use teach-back techniques to ensure patients  understanding.       Provide patients with handouts to enhance learning.     Expected therapeutic outcomes:        Understand relapse as an essential and non-linear process.       Be aware of the stages of change so they can help prevent relapse before it occurs.        Learn the importance of self-care and other healthy strategies to help reduce the risk of relapse.      Therapeutic outcome(s) measured by:     Patient s ability to teach-back information learned in group.     Patient s demonstration of learning by identification of personal triggers in each stage of relapse.      Identify coping strategies to reduce risk of relapse.           Group Attendance:  Attended group session    Patient's response to the group topic/interactions:  cooperative with task, discussed personal experience with topic, expressed understanding of topic, gave appropriate feedback to peers and listened actively    Patient appeared to be Actively participating, Attentive and Engaged.        Client specific details:  Client introduced himself to new group peer as well as listened while new peer introduced himself. He then participated in mindfulness activity stating: It was \"Pretty relaxing\", helped me out adding it has been a stressful day. Client continued to share that his found out right " before group that his girlfriend was in long-term for a DWI. He discussed the situation leading up to the DWI and feeling some guilt for not helping her when she got her car stuck. Client expressed feeling ok with the situation as well as grateful that he may be able to get a good nights sleep without her home. We discussed what enabling would look like in this type of situation. Client expressed no intent on bailing her out and thanked the group for listening.  Client was encouraged to reach out to STACY counselor if he feels he needs someone to talk to regarding the situation. Client watched a short video introducing the stages of change. We then went through a handout together and discussed each stage in detail as well as discussed early warning signs relating to each stage. Client reported early warning signs for emotional relapse being: Poor sleep habits and discontent. He stated one thing he can do to cope with early warning signs being:  Reaching out to others and asking for help. Client was emailed a copy of the handout so he can review it on his own time, if he would like. This group relates to goals in dimension 3 (breathing meditation and emotional relapse warning signs) and dimension 5 (gaining insight into personal triggers and early warning signs of relapse).      Plan: Client will be mindful of signs relating to the stages of relapse and reach out if he needs help or someone to talk to.

## 2023-01-20 NOTE — ADDENDUM NOTE
Encounter addended by: Dorothy Ulrich LADC on: 1/20/2023 1:04 PM   Actions taken: Charge Capture section accepted
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
## 2023-01-23 ENCOUNTER — HOSPITAL ENCOUNTER (OUTPATIENT)
Dept: BEHAVIORAL HEALTH | Facility: CLINIC | Age: 43
Discharge: HOME | End: 2023-01-23
Attending: FAMILY MEDICINE
Payer: COMMERCIAL

## 2023-01-23 PROCEDURE — H2035 A/D TX PROGRAM, PER HOUR: HCPCS | Mod: HQ,GT,95

## 2023-01-23 NOTE — ADDENDUM NOTE
Encounter addended by: Dorothy Ulrich LADC on: 1/22/2023 6:42 PM   Actions taken: Clinical Note Signed

## 2023-01-24 ENCOUNTER — HOSPITAL ENCOUNTER (OUTPATIENT)
Dept: BEHAVIORAL HEALTH | Facility: CLINIC | Age: 43
Discharge: HOME | End: 2023-01-24
Attending: FAMILY MEDICINE
Payer: COMMERCIAL

## 2023-01-24 PROCEDURE — H2035 A/D TX PROGRAM, PER HOUR: HCPCS | Mod: GT,95

## 2023-01-24 NOTE — TELEPHONE ENCOUNTER
----- Message from ELVIS Cormier sent at 1/23/2023  6:28 PM CST -----  Regarding: Scheduling  Please Schedule    SILKE LA [[id number]]  Location of program: [facility]  Date: 1/24/23  Time:10:00 am  ST[facility]Y IOP PHASE1 MIXED ([id number])    Visit type:  Zoom   Appointment Length: 60 min.   Billing Type: part of program     Thank You!  Dorothy Ulrich

## 2023-01-24 NOTE — ADDENDUM NOTE
Encounter addended by: Dorothy Ulrich LADC on: 1/24/2023 4:59 PM   Actions taken: Charge Capture section accepted

## 2023-01-24 NOTE — PROGRESS NOTES
"  Individual Session Summary   START TIME: 10:02AM   END TIME: 11:00AM   Duration: 1 Hour    Video Visit:      Provider verified identity through the following two step process.  Patient provided:  Patient is known previously to provider    Telemedicine Visit: The patient's condition can be safely assessed and treated via synchronous audio and visual telemedicine encounter.      Reason for Telemedicine Visit: Patient has requested telehealth visit  (Turned into phone conversation due to clients state of intoxication)    Originating Site (Patient Location): Patient's home    Distant Site (Provider Location): Provider Remote Setting- Home Office    Consent:  The patient/guardian has verbally consented to: the potential risks and benefits of telemedicine (video visit) versus in person care; bill my insurance or make self-payment for services provided; and responsibility for payment of non-covered services.     Patient would like the video invitation sent by:  Send to e-mail at: [email or web address]    Mode of Communication:  Video Conference via Medical Zoom (Turned into phone conversation due to clients state of intoxication)    Distant Location (Provider):  Off-site    As the provider I attest to compliance with applicable laws and regulations related to telemedicine.    Data:  Client did not log on at 10am for his zoom session. Counselor called him to make sure he remembered the appointment. Client answered the phone and appeared sounded intoxicated as he was slurring, repeating himself, confused, and expressed incoherent thoughts. He stated he had no idea what was going on. He was asked if he was drinking and he denied. He stated after group yesterday he did not take care of himself. That \"things escalated  Client was asked what he plans to do now that he recognizes he did not take care of himself. He stated,  I don t know what to do Dorothy\". He was asked if his girlfriend was home last night. He struggled to " "come up with the words to describe a breathalyzer. He stated one thing he could have done to take care of himself was to \"stand up for myself.\" He stated,  I'm sitting at home as I am talking to you, watching TV and that s basically about it . \"I don t want to lie but Im not on anything. You can say that Im on something but I m not. Aminah been very disconnected, I guess you could say. I struggle to connect to things; everything feels disconnected right now. I have never felt like this before, I don t feel like myself. I think I need to go to sleep, If I go I can sleep it off. \" Eventually he mentioned taking hydrocode 2 hours ago however later stated he took it at 10pm last night. He did not know the exact amount he consumed.   Says his girlfriend takes it herself. I could take it and go to work. Normally I am able to function fine on it I have not eating in 2 or three days. Why? I have not had an appetite.  I can't poop . He then mentioned taking trazadone around 8am slept for 2 hours. He says he was \"not very smart, should have made better moves, sucks because a lot of things that could have actually been done better.\" Counselor explained her concern for clients safety and informed him she would feel more comfortable if someone checked on him. Welfare check was mentioned and client stated he did not need it adding 'it would be a waste of time because I will be sleeping anyways.\" Client stated he was going to go to sleep and agreed to call STACY counselor as soon as he wakes up. Counselor informed client that he not attend group today due to his current state. He agreed with that decision. Counsleor reviewed the plan of client going to sleep and calling when he wakes up. He was also informed that counselor was going to be discussing the situation with her supervisor and if suggested to make a welfare check, counselor will have to do so. Client stated, \"OK, I understand\".     Intervention: Questions, patience, being " gentle with soft calm voice, rolling with resistance, client focused, discussion with supervisor, and welfare check.     Assessment: Client was displaying similar behaviors to when he first admitted to the program. He stayed calm throughout our conversation and even laugher out loud a few times. Client remained respectful and corporative, to the best of his ability.      Plan. Counselor consulted with Supervisor and it was agreed that a welfare check would be appropriate. Counselor called [facility] non emergency and made the report. Also, client is expected to call counselor once he wakes up.       Dorothy Ulrich, NATASHA, [facility]

## 2023-01-24 NOTE — GROUP NOTE
Group Therapy Documentation    PATIENT'S NAME: Nicko Perez  MRN:   [id number]  :   1980  ACCT. NUMBER: [id number]  DATE OF SERVICE: 23  START TIME:  3:00 PM  END TIME:  5:00 PM  FACILITATOR(S): Dorothy Ulrich LADC  TOPIC: BEH Group Therapy  Video Visit:      Provider verified identity through the following two step process.  Patient provided:  Patient is known previously to provider    Telemedicine Visit: The patient's condition can be safely assessed and treated via synchronous audio and visual telemedicine encounter.      Reason for Telemedicine Visit: Patient has requested telehealth visit    Originating Site (Patient Location): Patient's home    Distant Site (Provider Location): [facility] Outpatient Setting: [place]    Consent:  The patient/guardian has verbally consented to: the potential risks and benefits of telemedicine (video visit) versus in person care; bill my insurance or make self-payment for services provided; and responsibility for payment of non-covered services.     Patient would like the video invitation sent by:  Send to e-mail at: No e-mail address on record    Mode of Communication:  Video Conference via Medical Zoom    Distant Location (Provider):  On-site    As the provider I attest to compliance with applicable laws and regulations related to telemedicine.     Number of patients attending the group:  7  Group Length:  2 Hours    Group Therapy Type: Addiction and Life skill(s)    Summary of Group / Topics Discussed:    Coping Skills/Lifestyle Managemet, Choices in Recovery, Leisure Exploration/Use of Leisure Time, Meditation/Breathing Exercises, Mindfulness, Proper Nutrition & Exercise, Relaxation Techniques, Self-Care Activities, and Sleep Hygiene    Clients participated in a 10 minute guided meditation and shared reflection and feedback on their experience. Clients then answered a couple of questions to introduce themselves to new group member and vice  versa. Clients then took turns answering Monday check-in questions relating to updating progress and or concerns relating to each of the six dimensions.       Group Attendance:  Attended group session    Patient's response to the group topic/interactions:  cooperative with task and listened actively    Patient appeared to be Actively participating, Attentive and Engaged.        Client specific details:  Client reported the meditation was relaxing however expressed liking more musical instruments. He verbalized that it did help him stay mindful. He shared the following check in answers: No change is LDU.  He denies any other biomedical issues/concerns.  He rates the following on a scale of 0-10 (10 being highest), Depression 1-2,  Anxiety 1-2. Client expresses experiencing the following mental health symptoms: Exhausted and excited to get back to work.  He expresses coping by: Staying goal oriented, letting things go, and putting his needs first. He rated his Motivation at an 7 out of 10, (10 being highest). Client rates cravings at 0 out of 10 (10 being highest). Client was unable to identify any triggers as he denied having any thoughts of using.  Client did attend a virtual self help meeting this week. He participated in the following recreational activities: Watching football and sleeping.  One thing he did for himself this week: Talked to my Mom for an hour. This group related to dim 3 (meditation activitity) and all 6 dimensions as clients update progress and concerns relating to each.      Plan: Client had individual session with KELSEA on 1/24/23 at 10am. He will also be emailed multiple different mindfulness activities/videos to use when he is feeling frustrated or anxious.

## 2023-01-25 ENCOUNTER — HOSPITAL ENCOUNTER (OUTPATIENT)
Dept: BEHAVIORAL HEALTH | Facility: CLINIC | Age: 43
Discharge: HOME | End: 2023-01-25
Attending: FAMILY MEDICINE
Payer: COMMERCIAL

## 2023-01-25 DIAGNOSIS — F10.20 ALCOHOL USE DISORDER, SEVERE, DEPENDENCE (H): Primary | ICD-10-CM

## 2023-01-25 PROCEDURE — H2035 A/D TX PROGRAM, PER HOUR: HCPCS | Mod: GT,95

## 2023-01-25 PROCEDURE — H2035 A/D TX PROGRAM, PER HOUR: HCPCS | Mod: HQ,GT,95

## 2023-01-25 NOTE — PROGRESS NOTES
"  Individual Session Summary   START TIME: 9:00 AM   END TIME: 9:45AM   Duration: 45 Minutes    Video Visit:      Provider verified identity through the following two step process.  Patient provided:  Patient is known previously to provider    Telemedicine Visit: The patient's condition can be safely assessed and treated via synchronous audio and visual telemedicine encounter.      Reason for Telemedicine Visit: Patient has requested telehealth visit    Originating Site (Patient Location): Patient's home    Distant Site (Provider Location): [facility] Outpatient Setting: [place]    Consent:  The patient/guardian has verbally consented to: the potential risks and benefits of telemedicine (video visit) versus in person care; bill my insurance or make self-payment for services provided; and responsibility for payment of non-covered services.     Patient would like the video invitation sent by:  Send to e-mail at: [email or web address]    Mode of Communication:  Video Conference via Medical Zoom    Distant Location (Provider):  On-site    As the provider I attest to compliance with applicable laws and regulations related to telemedicine.    Data:  Met with client on this date to check-in relating to concerns from clients individual session, yesterday. ays its been a rough past couple of days with \"the girlfriend getting a DWI and stuff.\" Counselor asked client if he remembered talking to me yesterday. He said, Yes . Counselor asked what was going on yesterday? He expressed feeling depressed about the situation and that he called his Mom and Dad. He stated taking Trazadone and a couple hydrooxidine s (Yesterday he verbalized it was hydrocodone, however was very disoriented, could have been mistaken) ,which he states made him very tired. He says he slept for  A little bit  yesterday and has been up since 5pm yesterday. He states having a history of insomnia stating he has talked to his doctor and was prescribed " Trazadone. He acknowledges it was not a good idea to take those medications claiming  I just wanted to go to sleep . He expressed not taking care of himself by spending a lot of time communicating the situation with his girlfriends, to her family/friends.  He mentioned financial situation is also causing stress. We discussed the importance of obtaining routine sleep/schedule.  He identified the goal of needed to go to bed at 6pm. He was challenged to identify a couple of strategies to help him get to sleep in a timely manner. Client suggested shutting the TV off at 6pm, eat a more hearty dinner, not taking naps during day, listening to mindfulness meditation before bed, and cut down caffeine intake to two 12oz cups of coffee, per morning.He also agreed not to take his sleep medications during the day anymore.  This goal and these strategies were updated on clients Treatment plan. Client was also informed he will need to submit to a UA within 72 hours, which he agreed to do.     Intervention: Q and A, developing goals/strategies, MI, client centered     Assessment: Client was more alert today. He did express taking two of his pills again this morning stating he wanted to sleep. Some of his statements are inconsistent such as stating that everything has been going great with his GF since her DWI and then stating she is still mean without drinking and that he is stressed about the relationship.      Plan. Client will submit to a UA, follow new strategies outlined in his treatment plan relating to sleep hygeine, and start working on new treatment assignments discussed.     Dorothy Ulrich, NATASHA, LADC

## 2023-01-26 ENCOUNTER — HOSPITAL ENCOUNTER (OUTPATIENT)
Dept: BEHAVIORAL HEALTH | Facility: CLINIC | Age: 43
Discharge: HOME | End: 2023-01-26
Attending: FAMILY MEDICINE
Payer: COMMERCIAL

## 2023-01-26 PROCEDURE — H2035 A/D TX PROGRAM, PER HOUR: HCPCS | Mod: HQ,GT,95

## 2023-01-26 NOTE — ADDENDUM NOTE
Encounter addended by: Dorothy Ulrich LADC on: 1/26/2023 1:41 PM   Actions taken: Charge Capture section accepted

## 2023-01-26 NOTE — GROUP NOTE
Psychoeducation Group Documentation    PATIENT'S NAME: Nicko Perez  MRN:   [id number]  :   1980  ACCT. NUMBER: [id number]  DATE OF SERVICE: 23  START TIME:  3:00 PM  END TIME:  5:00 PM  FACILITATOR(S): Dorothy Ulrich LADC  TOPIC: BEH Pyschoeducation  Video Visit:      Provider verified identity through the following two step process.  Patient provided:  Patient is known previously to provider    Telemedicine Visit: The patient's condition can be safely assessed and treated via synchronous audio and visual telemedicine encounter.      Reason for Telemedicine Visit: Patient has requested telehealth visit    Originating Site (Patient Location): Patient's home    Distant Site (Provider Location): [facility] Outpatient Setting: [place]    Consent:  The patient/guardian has verbally consented to: the potential risks and benefits of telemedicine (video visit) versus in person care; bill my insurance or make self-payment for services provided; and responsibility for payment of non-covered services.     Patient would like the video invitation sent by:  Send to e-mail at: No e-mail address on record    Mode of Communication:  Video Conference via Medical Zoom    Distant Location (Provider):  On-site    As the provider I attest to compliance with applicable laws and regulations related to telemedicine.     Number of patients attending the group: 8  Group Length:  2 Hours    Skills Group Therapy Type: Recovery skills, Emotion regulation skills, and Healthy behaviors development    Summary of Group / Topics Discussed:    Mindfulness/Relaxation skills, Coping/DBT skills, and Symptom management skills    Clients participated in counting mindfulness activity and shared feedback. Clients took turns answering questions relating to introducing new peer to group. Clients then gained insight into radical acceptance vs acceptance and well as key components of the concepts. They then shared one thing they  are struggling to radically accept in their life, that is causing them unnecessary suffering. Clients then listened as another peer shared his completed assignment on Anxiety and chemical dependency.     Topic: Radical Acceptance    This topic will help clients gain insight and understanding into the difference between acceptance and radical acceptance and how not accepting reality leads to pain turning into suffering.    Objective(s):     Clients will understand what it means Radically accept something    Clients will identify how not accepting reality can lead to unnecessary suffering    Clients will learn skills towards radical acceptance.    Clients will identify one thing in their life they have struggled to let go or accept    Structure (modalities, homework, worksheets, etc):     Group discussions, sharing personal experiences and feedback to fellow peers    Went through worksheet/handout      Expected therapeutic outcome(s):   Patient will:    Understand the importance of accepting the things we cannot change.     Understand how rejecting reality leads to unnecessary suffering    Therapeutic outcome(s) measured by:   Clients ability to teach back information, actively participate in group discussions, and participate in follow-up discussion to see if they can identify ways things in their life they have not yet radically accepted.       Group Attendance:  Attended group session    Patient's response to the group topic/interactions:  expressed understanding of topic, unable to interrupt client and verbalizations were off topic    Patient appeared to be Actively participating, Attentive and Engaged.         Client specific details:  Client shared the following check-in answers: One thing he needs to work on: Putting myself first. Two affirmations: I am caring and I am positive. One obstacle relating to his recovery: Lakcing self care.  Client shared the following insight into the mindfulness activity (which  "consisted of clients standing up, closing their eyes, and then quietly sitting down once they feel two minutes had passed): I was counting in my head. Kept me in the moment\". He participated in discussion regarding Radical acceptance stating one thing he is struggling to radically accept is: Wife cheating on. After counselor asked if there was anyone she missed, client spoke and started talking off topic. Counselor informed client that he had already shared an answer and he stated that was just a reply to a something a peer said. He proceeded to talk however was not sharing the information asked. Counselor interrupted him and explained that we needed to move on for the sake of time. Client continued to talk and did not  answer the question at hand. Client then listened while fellow peers shared completed treatment assignments. This group focused on Dim 3 (mindfulness activity and Radical acceptance) as well as Dim 4 (Homework share).      Plan: Counselor emailed client the information of Radical acceptance. He had an assignment completed and ready to share however, due to time, will have to share his assignment next week.       "

## 2023-01-27 NOTE — ADDENDUM NOTE
Encounter addended by: Dorothy Ulrich LADC on: 1/27/2023 5:28 PM   Actions taken: Charge Capture section accepted

## 2023-01-27 NOTE — GROUP NOTE
Group Therapy Documentation    PATIENT'S NAME: Nicko Perez  MRN:   [id number]  :   1980  ACCT. NUMBER: [id number]  DATE OF SERVICE: 23  START TIME:  3:00 PM  END TIME:  5:00 PM  FACILITATOR(S): Dorothy Ulrich LADC  TOPIC: BEH Group Therapy  Video Visit:      Provider verified identity through the following two step process.  Patient provided:  Patient is known previously to provider    Telemedicine Visit: The patient's condition can be safely assessed and treated via synchronous audio and visual telemedicine encounter.      Reason for Telemedicine Visit: Patient has requested telehealth visit    Originating Site (Patient Location): Patient's home    Distant Site (Provider Location): Provider Remote Setting- Home Office    Consent:  The patient/guardian has verbally consented to: the potential risks and benefits of telemedicine (video visit) versus in person care; bill my insurance or make self-payment for services provided; and responsibility for payment of non-covered services.     Mode of Communication:  Video Conference via Medical Zoom    Distant Location (Provider):  Off-site    As the provider I attest to compliance with applicable laws and regulations related to telemedicine.     Topic:?Interpersonal Effectiveness Skills     ? This topic will give a general overview of how to communicate more effectively when it comes to asserting objectiveness, maintaining relationships, and increasing self-respect/worth.      ? Objective(s):      ? Patient will be able to demonstrate ability to use the KRISHNA Skill in a roleplay/share activity.     Client will share at least 1 way their use negatively impacted their values/respect for self.      Patient will identify at least 2 ways to utilize Interpersonal effectiveness skills into daily life.      ? Structure:     ? Provide psychoeducation on DBT- Interpersonal Effectiveness Skills     Facilitate group discussion around each patient's experiences  "and understanding of Interpersonal effectiveness skills.      Use teach-back techniques to ensure patients' understanding.     Use worksheets to provide visual aid to help increase learning an understanding.     ?   Expected therapeutic outcomes:      Learn more effective way to get their needs met.      Help to improve important relationships     Help client to improve self-worth and self-respect.      ? Therapeutic outcome(s) measured by:      ? Patients ability to teach-back information learned in group.     Patient's demonstration of learning by participating in role play activity and sharing personal experiences and thoughts.?     ?     Number of patients attending the group:  7  Group Length:  2 Hours    Group Therapy Type: Life skill(s) and Skills/Education    Summary of Group / Topics Discussed:    Communication, Coping Skills/Lifestyle Managemet, Interpersonal Effectiveness, Leisure Exploration/Use of Leisure Time, and Mindfulness        Group Attendance:  Attended group session    Patient's response to the group topic/interactions:  verbalizations were off topic    Patient appeared to be incoherent at times and feedback was off topic. .        Client specific details:   Client shared the following answers to daily questions: Two feelings:Overwhelmed (Due to \"Everything going on\") and stuck (Feels like he is being backed into a corner). Two things grateful for: He could only identify one thing, his cats. Biggest trigger over the weekend: Cleaning and finding empty liquor bottles.  Client shared the following feedback on Mindfulness activity: \"Thought it was pretty good. Helped me to stay mindful\". Client gained insight into how to communicate our needs in a more effective manner by going through D.E.A.R.M.A.N. He expressed the one concept of lori that he struggles with most being: Expressing his feelings. This group related to goals in dim 3 (mindfulness activity and DBT skill) as well as dim 6 " (improving communication/relationship skill).    Plan: Client will be mindful of practicing using more expression of his feelings when communicating with others this weekend.
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
## 2023-01-30 ENCOUNTER — HOSPITAL ENCOUNTER (OUTPATIENT)
Dept: BEHAVIORAL HEALTH | Facility: CLINIC | Age: 43
Discharge: HOME | End: 2023-01-30
Attending: FAMILY MEDICINE
Payer: COMMERCIAL

## 2023-01-30 ENCOUNTER — OFFICE VISIT (OUTPATIENT)
Dept: FAMILY MEDICINE | Facility: CLINIC | Age: 43
End: 2023-01-30
Payer: COMMERCIAL

## 2023-01-30 VITALS
TEMPERATURE: 99.5 F | OXYGEN SATURATION: 97 % | BODY MASS INDEX: 26.77 KG/M2 | SYSTOLIC BLOOD PRESSURE: 144 MMHG | HEIGHT: 70 IN | WEIGHT: 187 LBS | RESPIRATION RATE: 16 BRPM | HEART RATE: 96 BPM | DIASTOLIC BLOOD PRESSURE: 92 MMHG

## 2023-01-30 DIAGNOSIS — A08.4 VIRAL GASTROENTERITIS: Primary | ICD-10-CM

## 2023-01-30 DIAGNOSIS — F10.20 ALCOHOL USE DISORDER, SEVERE, DEPENDENCE (H): ICD-10-CM

## 2023-01-30 PROCEDURE — 80307 DRUG TEST PRSMV CHEM ANLYZR: CPT | Mod: 90 | Performed by: NURSE PRACTITIONER

## 2023-01-30 PROCEDURE — H2035 A/D TX PROGRAM, PER HOUR: HCPCS | Mod: HQ,GT,95

## 2023-01-30 PROCEDURE — 99203 OFFICE O/P NEW LOW 30 MIN: CPT | Performed by: NURSE PRACTITIONER

## 2023-01-30 PROCEDURE — 80307 DRUG TEST PRSMV CHEM ANLYZR: CPT | Mod: 59 | Performed by: NURSE PRACTITIONER

## 2023-01-30 PROCEDURE — 99000 SPECIMEN HANDLING OFFICE-LAB: CPT | Performed by: NURSE PRACTITIONER

## 2023-01-30 ASSESSMENT — ENCOUNTER SYMPTOMS
MYALGIAS: 1
FATIGUE: 0
COUGH: 0
SHORTNESS OF BREATH: 0
FEVER: 0
NAUSEA: 1
DIAPHORESIS: 0
SORE THROAT: 0
APPETITE CHANGE: 1
SINUS PRESSURE: 0
RHINORRHEA: 0
EYE DISCHARGE: 0
HEADACHES: 0
WHEEZING: 0
VOMITING: 1
DIARRHEA: 1

## 2023-01-30 ASSESSMENT — PAIN SCALES - GENERAL: PAINLEVEL: MILD PAIN (2)

## 2023-01-30 NOTE — PROGRESS NOTES
"  Assessment & Plan     Viral gastroenteritis  Resolved.  Work letter given.    20 minutes spent on the date of the encounter doing chart review, history and exam, documentation and further activities per the note     Nicotine/Tobacco Cessation:  He reports that he has been smoking cigarettes. He has been smoking an average of .5 packs per day. He has never used smokeless tobacco.  Nicotine/Tobacco Cessation Plan:       BMI:   Estimated body mass index is 26.83 kg/m  as calculated from the following:    Height as of this encounter: 1.778 m (5' 10\").    Weight as of this encounter: 84.8 kg (187 lb).       No follow-ups on file.    Ana Larsen, SHADE CNP  [facility] [place] Maharaj is a 42 year old, presenting for the following health issues:  Patient Request for Note/Letter      History of Present Illness       Reason for visit:  Doctors note for work and urin analises    He eats 2-3 servings of fruits and vegetables daily.He consumes 0 sweetened beverage(s) daily.He exercises with enough effort to increase his heart rate 30 to 60 minutes per day.  He exercises with enough effort to increase his heart rate 5 days per week.   He is taking medications regularly.       Acute Illness  Acute illness concerns: COVID test negative at home  Onset/Duration: 1 week   Symptoms:  Fever: No  Chills/Sweats: No  Headache (location?): No  Sinus Pressure: No  Conjunctivitis:  No  Ear Pain: no  Rhinorrhea: No  Congestion: No  Sore Throat: No  Cough: no  Wheeze: No  Decreased Appetite: YES  Nausea: YES  Vomiting: YES  Diarrhea: YES  Dysuria/Freq.: No  Hematuria: No  Fatigue/Achiness: YES  Sick Exposure: No  Therapies tried and outcome: None    *Out of work last week and needs note to state he is cleared to return    [facility]      Was ill last week with nausea, vomiting and diarrhea. Thinks had it for 5-6 days. During that time was not able to go to work. If misses more than 3 days needs a note " return. Started feeling better yesterday. Works in a bakery. Put self on liquid diet at home. Now has been gradually increasing diet. Is scheduled to return to work on Thurs. No vomiting or diarrhea for 24+ hours.  Is feeling much better    Review of Systems   Constitutional: Positive for appetite change (decreased). Negative for diaphoresis, fatigue and fever.   HENT: Negative for congestion, ear pain, rhinorrhea, sinus pressure and sore throat.    Eyes: Negative for discharge.   Respiratory: Negative for cough, shortness of breath and wheezing.    Cardiovascular: Negative for chest pain.   Gastrointestinal: Positive for diarrhea (resolved), nausea (improved) and vomiting (resolved).   Musculoskeletal: Positive for myalgias (resolved).   Neurological: Negative for headaches.          Objective    BP (!) 144/92   Pulse (!) 122   Temp 99.5  F (37.5  C) (Tympanic)   Resp 16   Wt 84.8 kg (187 lb)   SpO2 97%   BMI 26.83 kg/m    Body mass index is 26.83 kg/m .  Physical Exam  Constitutional:       Appearance: He is well-developed.   HENT:      Head: Normocephalic and atraumatic.      Right Ear: Tympanic membrane and external ear normal. No middle ear effusion. Tympanic membrane is not erythematous.      Left Ear: Tympanic membrane and external ear normal.  No middle ear effusion. Tympanic membrane is not erythematous.      Nose: No mucosal edema or rhinorrhea.   Cardiovascular:      Rate and Rhythm: Normal rate and regular rhythm.      Heart sounds: Normal heart sounds.   Pulmonary:      Effort: Pulmonary effort is normal.      Breath sounds: Normal breath sounds. No wheezing.   Abdominal:      General: Bowel sounds are normal.      Palpations: Abdomen is soft.   Skin:     General: Skin is warm and dry.   Neurological:      Mental Status: He is alert.

## 2023-01-30 NOTE — LETTER
[facility]  [address]  [place] [address]  Phone: [phone]    January 30, 2023        Nicko Perez  [address] [place] [address]        RE: Nicko Perez    Patient was seen and treated today at our clinic. He may return to work without restriction on 2/2/23.     Please contact me for questions or concerns.      Sincerely,        SHADE Mead CNP

## 2023-01-31 ENCOUNTER — HOSPITAL ENCOUNTER (OUTPATIENT)
Dept: BEHAVIORAL HEALTH | Facility: CLINIC | Age: 43
Discharge: HOME | End: 2023-01-31
Attending: FAMILY MEDICINE
Payer: COMMERCIAL

## 2023-01-31 LAB — ETHYL GLUCURONIDE UR QL SCN: NEGATIVE NG/ML

## 2023-01-31 PROCEDURE — H2035 A/D TX PROGRAM, PER HOUR: HCPCS | Mod: HQ,GT,95 | Performed by: SOCIAL WORKER

## 2023-01-31 NOTE — GROUP NOTE
Group Therapy Documentation    PATIENT'S NAME: Nicko Perez  MRN:   [id number]  :   1980  ACCT. NUMBER: [id number]  DATE OF SERVICE: 23  START TIME:  3:00 PM  END TIME:  5:00 PM  FACILITATOR(S): Dorothy Ulrich LADC  TOPIC: BEH Group Therapy  Video Visit:      Provider verified identity through the following two step process.  Patient provided:  Patient is known previously to provider    Telemedicine Visit: The patient's condition can be safely assessed and treated via synchronous audio and visual telemedicine encounter.      Reason for Telemedicine Visit: Patient has requested telehealth visit    Originating Site (Patient Location): Patient's home    Distant Site (Provider Location): [facility] Outpatient Setting: [place]    Consent:  The patient/guardian has verbally consented to: the potential risks and benefits of telemedicine (video visit) versus in person care; bill my insurance or make self-payment for services provided; and responsibility for payment of non-covered services.     Patient would like the video invitation sent by:  Send to e-mail at: No e-mail address on record    Mode of Communication:  Video Conference via Medical Zoom    Distant Location (Provider):  On-site    As the provider I attest to compliance with applicable laws and regulations related to telemedicine. Number of patients attending the group:  8  Group Length:  2 Hours    Group Therapy Type: Addiction and Skills/Education    Summary of Group / Topics Discussed:    Coping Skills/Lifestyle Managemet, Choices in Recovery, Interpersonal Effectiveness, Leisure Exploration/Use of Leisure Time, Mindfulness, Proper Nutrition & Exercise, Relationships, Self-Care Activities, and Sleep Hygiene    Clients participated in 5 minute mindfulness stretch and commented on activity thoughts. Clients then took turns sharing their weekly check-in answers consisting of updating the group on how they are doing in each of the  "six dimensions. At the end of group clients reviewed the topic from last group KRISHNA skills, were shown a short educational video to review each of KRISHNA's concepts and then took turns identifying whether or not they \"Consciously\" practiced the concept they identified last group, is the hardest for them.       Topic:?DBT- KRISHNA (Review)     This topic will give a general overview of how to communicate more effectively when it comes to asserting objectiveness    ? Objective(s):       ?Patient will be able to demonstrate ability to use the KRISHNA Skill in a roleplay/share activity as well as outside of the group setting        ?Structure:      Watched a 5min educational video to provide visual aid  Use teach-back techniques to ensure patients' understanding.   Asking each client if they practiced this skill over the weekend        Expected therapeutic outcomes:       Learn more effective way to get their needs met.       Help to improve important relationships          ?Therapeutic outcome(s) measured by:       Client ability to utilize this skill outside of the group setting.        Group Attendance:  Attended group session    Patient's response to the group topic/interactions:  cooperative with task, expressed understanding of topic and listened actively    Patient appeared to be Actively participating, Attentive and Engaged.        Client specific details:  Client participated in mindful stretching meditation stating, \"I enjoyed it. Stretched my back out. Stayed in the moment\".He shared the following check in answers: No change is LDU. Endorses the following biomedical issues/concerns: Pain on his side, \"really bad\". Also claims he has not slept in two days. Client denies having any medical appointments this week. He rates the following on a scale of 0-10 (10 being highest), Depression 2,  Anxiety 4. Client expresses experiencing the following mental health symptoms: Client says he had a panic attack when he " "went to the doctor. This was his reasoning for having a blood pressure and heart rate very elevated. He expresses coping by: Breathing exercises. He rated his Motivation at an 7 out of 10 (10 being highest). Client rates cravings at 3 out of 10 (10 being highest). He reported triggers being: Beer at my parents house and lack of sleep. He coped by \"Watching movies and cleaning\". Client did not attend a self help meetings this week. He participated in the following recreational activities:Watched football and movies. One thing he did for himself this week: Took care of stuff when he was sick. No Concerns reported at this time. Client listened respectfully while fellow peers completed their check-ins. After reviewing last group topic, he was asked if he consciously used KRISHNA while communicating with others, especially using expression (as that is what he identified is the hardest for him). Client stated he practiced expressing himself to his S.O. This group focused goals in dimension 2/3 (Mindful stretching and review on KRISHNA) as well as all updated clients progress/concerns relating to each of the six dimensions    Plan: Client will continue to practice asserting his thoughts and feelings. He will also call his PCP first between 7-8am 1/31/23 to schedule an appointment.      "

## 2023-01-31 NOTE — ADDENDUM NOTE
Encounter addended by: Dorothy Ulrich LADC on: 1/31/2023 4:55 PM   Actions taken: Charge Capture section accepted

## 2023-02-01 ENCOUNTER — HOSPITAL ENCOUNTER (OUTPATIENT)
Dept: BEHAVIORAL HEALTH | Facility: CLINIC | Age: 43
Discharge: HOME | End: 2023-02-01
Attending: FAMILY MEDICINE
Payer: COMMERCIAL

## 2023-02-01 PROCEDURE — H2035 A/D TX PROGRAM, PER HOUR: HCPCS | Mod: HQ,GT,95

## 2023-02-01 NOTE — GROUP NOTE
Group Therapy Documentation    PATIENT'S NAME: Nicko Perez  MRN:   [id number]  :   1980  ACCT. NUMBER: [id number]  DATE OF SERVICE: 23  START TIME:  3:00 PM  END TIME:  6:00 PM  FACILITATOR(S): Maylin Guerrero LICSW  TOPIC: BEH Group Therapy  Number of patients attending the group:  5  Group Length:  3 Hours    Group Therapy Type: Addiction    Summary of Group / Topics Discussed:    Psychoeducation/Skills Self-Care and Humor in Recovery (IOP)  Learn how powerful self-care is in healing from addiction and building resiliency in mental and physical health.  Learn many aspects of self-care linked to elevating moods, increasing energy reserve and staminal, thinking clearer, focusing better on tasks and improving organizational skills.     Objective(s):    Name 3 reasons self-care is crucial for optimal health and recovery from illness.      Identify 2 mental and physical illnesses directly linked to poor self-care.    Identify 3 ways social connections build resilience and may strengthen our immune systems.    Create a personal plan of action to add to daily schedule of structure and routine.     Structure (modalities, homework, worksheets, etc.):    Self-Care quiz (worksheet)    Education on Self-Care with emphasis on Recovery (PowerPoint)    Why Self-care is a necessity, not a luxury (discussion/white board)    Worksheet to explore and prioritize needs    Action plan to start this week    Expected therapeutic outcome(s):     Be more proactive in their mental and physical health     Start their personal self-care plan this week and note results in journal    Take an inventory of choices and behaviors that hinder their health and have a negative effect on the quality of their lives.      Work to remove the obstacles that keep them from these aspects of self-care.     Therapeutic outcome(s) measured by:   Teachback, creation of personal action plan, and group review and evaluation    Video Visit:       "  Provider verified identity through the following two step process.  Patient provided:  Patient is known previously to provider and Patient was verified at admission/transfer     Telemedicine Visit: The patient's condition can be safely assessed and treated via synchronous audio and visual telemedicine encounter.      Reason for Telemedicine Visit: Services only offered telehealth.    Originating Site (Patient Location): Patient's home    Distant Site (Provider Location):  Provider Remote Setting - Home Office     Consent:  The patient/guardian has verbally consented to: the potential risks and benefits of telemedicine (video visit) versus in person care; bill my insurance or make self-payment for services provided; and responsibility for payment of non-covered services.     Patient would like the video invitation sent by:  Other e-mail: [email or web address]    Mode of Communication:  Video Conference via Medical Zoom    Distant Location (Provider):  Off-site    As the provider I attest to compliance with applicable laws and regulations related to telemedicine.    Group Attendance:  Attended group session    Patient's response to the group topic/interactions:  discussed personal experience with topic and expressed understanding of topic    Patient appeared to be Actively participating.        Client specific details:  Darrian shared that he hasn't been as active as he'd like to be and he is going back to work on Thurs of this week, 2/02/23. He spoke of a coworker who he thinks wants his job and that he was recovering from the flu. His is doing mindfulness occasionally and his stress is at 1 \"because I got more sleep.\" He went off topic a bit but was easily redirected today. WORKSHEET: The self-care area(s) chosen to work on this week was \"eating better again\" and he talked about being concerned about the high price of food. This topic had a resource for him about eating healthy on a budget. The committed action(s) " chosen to do this week is going shopping tomorrow to get more vegetables and fruit. This topic completes one of the six MH Skills Groups required under D3 of his Treatment Plan.

## 2023-02-02 ENCOUNTER — HOSPITAL ENCOUNTER (OUTPATIENT)
Dept: BEHAVIORAL HEALTH | Facility: CLINIC | Age: 43
Discharge: HOME | End: 2023-02-02
Attending: FAMILY MEDICINE
Payer: COMMERCIAL

## 2023-02-02 PROCEDURE — H2035 A/D TX PROGRAM, PER HOUR: HCPCS | Mod: HQ,GT,95

## 2023-02-02 NOTE — GROUP NOTE
Group Therapy Documentation  Video Visit:      Provider verified identity through the following two step process.  Patient provided:  Patient is known previously to provider    Telemedicine Visit: The patient's condition can be safely assessed and treated via synchronous audio and visual telemedicine encounter.      Reason for Telemedicine Visit: Patient has requested telehealth visit    Originating Site (Patient Location): Patient's home    Distant Site (Provider Location): [facility] Outpatient Setting: [place]    Consent:  The patient/guardian has verbally consented to: the potential risks and benefits of telemedicine (video visit) versus in person care; bill my insurance or make self-payment for services provided; and responsibility for payment of non-covered services.     Patient would like the video invitation sent by:  Send to e-mail at: [email or web address]    Mode of Communication:  Video Conference via Medical Zoom    Distant Location (Provider):  On-site    As the provider I attest to compliance with applicable laws and regulations related to telemedicine.      PATIENT'S NAME: Nicko Perez  MRN:   [id number]  :   1980  ACCT. NUMBER: [id number]  DATE OF SERVICE: 23  START TIME:  3:00 PM  END TIME:  5:00 PM  FACILITATOR(S): Dorothy Ulrich LADC  TOPIC: BEH Group Therapy  Number of patients attending the group:  8  Group Length:  2 Hours    Group Therapy Type: Addiction and Life skill(s)    Summary of Group / Topics Discussed:    Communication, Choices in Recovery, Grief & Loss, Mindfulness, Self-Esteem/Self Forest, and Sleep Hygiene    Clients participated in a mindfulness activity in which clients were showed a picture/image for 1 min and asked to remember as many details as they can about the picture. Counselor then asked clients questions about the picture which they were to write down. We would then review the answers with the group. We repeated this activity with 3  "different pictures. Clients then reflected on thoughts and experiences with the activity. Clients answered a couple of check in questions: Any use episodes? One feeling, one affirmation, and one goal for today?. Clients then took time to share completed treatment assignments as well as feedback. The last few minutes of group, counselor called a peer that had been in the hospital so that he could say hi and hear positive feedback from his peers.       Group Attendance:  Attended group session    Patient's response to the group topic/interactions:  expressed understanding of topic, gave appropriate feedback to peers and listened actively    Patient appeared to be Actively participating, Attentive and Engaged.        Client specific details:   Client shared the following reflection on mindfulness activity \"Prety good. I was distracted for a moment which made it difficult to remember everything\". He shared following answers to check in questions: No use episodes. Feeling: Irritated stating he has been feeling more easily annoyed the past few days. affirmation \"Im doing a good job eating healthier\", a goal: \"Get a good nights sleep\". Client listened while fellow peer shared his assignment on harmful consequences and another peer share his assignment on \"Grandiosity\", sharing supportive and encouraging feedback to both. Client stated having an assignment done however due to time, he will be sharing it next week. Client took time to say hi to a peer on the phone that has been absent from group due to physical issues. This group focused on goals in dimension 3 (mindfulness activity) and dimension 4 (sharing homework assignments).     Plan: Client will be mindful of grandiose thoughts and share examples during tomorrows group. He will share his completed assignment during next weeks group.      "

## 2023-02-02 NOTE — ADDENDUM NOTE
Encounter addended by: Dorothy Ulrich LADC on: 2/2/2023 4:27 PM   Actions taken: Charge Capture section accepted
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
## 2023-02-03 NOTE — GROUP NOTE
Group Therapy Documentation     Client left the session at 3:40 and is billed only for his time in session today.     PATIENT'S NAME: Nicko Perez  MRN:   [id number]  [facility]   1980  [id number]  DATE OF SERVICE: 23  START TIME:  3:00 PM  END TIME:  5:00 PM  FACILITATOR(S): Nicko Burton  TOPIC: BEH Group Therapy  Number of patients attending the group:  7  Group Length:  2 Hours    Group Therapy Type: Psychoeducation    Summary of Group / Topics Discussed:       Balanced Lifestyle, Coping Skills/Lifestyle Management, and Cross Addiction, Spirituality, Peer Support and Fellowship    Psychoeducational/Skills: Cross Addiction (STACY)        Objective(s):    Patients will discuss up to 2  Cross Addiction  substance or behavioral addictions they are now aware of that could play a role in developing a new substance use disorder or behavioral addiction and play a role in resuming use of their drug of choice.      Patients will identify 2 healthy sober connecting alternatives can connect with to replace the  addiction void  of not using their drug of choice.        Structure (modalities, homework, worksheets, etc.):    Provide psychoeducation on cross addiction, relapse prevention and sober healthy coping skills.    Facilitate group discussion around each patient's current awareness of both substance and behavioral cross addictions and the importance of being vigilant in learning and maintaining sober healthy coping skills to decrease risk of cross addiction and relapse.         Expected therapeutic outcome(s):        Patients will be more aware of what cross addiction is and be able to work on healthier choices than turning to other addictive substances or behaviors. This will be monitored by patients' self-report of living a more balanced lifestyle and their scores lessening while in treatment on their mental health and cravings as tracked by Likert scales while in treatment.       Patients  will have less fewer use episodes related to cross addiction as reported on their check ins or from UA taken while in treatment          Provider verified identity through the following two step process.  Patient provided:  Patient  and Patient address    Telemedicine Visit: The patient's condition can be safely assessed and treated via synchronous audio and visual telemedicine encounter.      Reason for Telemedicine Visit: Patient has requested telehealth visit    Originating Site (Patient Location): Patient's home    Distant Site (Provider Location): [facility]    Consent:  The patient/guardian has verbally consented to: the potential risks and benefits of telemedicine (video visit) versus in person care; bill my insurance or make self-payment for services provided; and responsibility for payment of non-covered services.     Patient would like the video invitation sent by:  Send to e-mail at: [email or web address]    Mode of Communication:  Video Conference via Medical Zoom    Distant Location (Provider):  On-site    As the provider I attest to compliance with applicable laws and regulations related to telemedicine.      Group Attendance:  Attended group session    Patient's response to the group topic/interactions:  Initially shared personal information, then shared he was upset and would leave group. He shared he would call focal counselor and followed through on that.     Patient appeared to be Attentive, Distracted and Non-participatory.        Client specific details:  Client checked in reporting ongoing sobriety and expressed that what is better for him being sober is his sleep is better and that he has more energy. Client participated in a  in a guided visualization, mindfulness, meditation and shared that he found it fine, he was asked to elaborate he expressed that he had felt unheard and not given an opportunity to express himself earlier in his check-in and  that is why he was upset and not able to benefit from the meditations. Writer of this not apologized and gave him the opportunity to continue to express himself.    He chose to leave the session as he expressed he was upset and would contact his focal counselor about the concern he has about an issue he does not now want to discuss in the session and his being upset with the writer of this note. He did follow up with his focal counselor and his focal counselor and his focal counselor reported he processed his concerns with her.     Plan: Client plan was to follow up with his focal counselor, which he did after leaving the session early.

## 2023-02-06 ENCOUNTER — HOSPITAL ENCOUNTER (OUTPATIENT)
Dept: BEHAVIORAL HEALTH | Facility: CLINIC | Age: 43
Discharge: HOME | End: 2023-02-06
Attending: FAMILY MEDICINE
Payer: COMMERCIAL

## 2023-02-06 ENCOUNTER — TELEPHONE (OUTPATIENT)
Dept: BEHAVIORAL HEALTH | Facility: CLINIC | Age: 43
End: 2023-02-06
Payer: COMMERCIAL

## 2023-02-06 PROCEDURE — H2035 A/D TX PROGRAM, PER HOUR: HCPCS | Mod: HQ,GT,95

## 2023-02-06 NOTE — TELEPHONE ENCOUNTER
----- Message from ELVIS Cormier sent at 2/6/2023 12:48 PM CST -----  Regarding: Scheduling  Please Schedule    SILKE LA [[id number]]  Location of program: [place]  Date: 2/8/23  Time:10:00 am  STACY IOP PHASE1 MIXED ([id number])    Visit type:  Zoom   Appointment Length: 60 min.   Billing Type: part of program     Thank You!  Dorothy Ulrich

## 2023-02-07 ENCOUNTER — HOSPITAL ENCOUNTER (OUTPATIENT)
Dept: BEHAVIORAL HEALTH | Facility: CLINIC | Age: 43
Discharge: HOME | End: 2023-02-07
Attending: FAMILY MEDICINE
Payer: COMMERCIAL

## 2023-02-07 PROCEDURE — H2035 A/D TX PROGRAM, PER HOUR: HCPCS | Mod: HQ,GT,95 | Performed by: SOCIAL WORKER

## 2023-02-07 NOTE — GROUP NOTE
Group Therapy Documentation    PATIENT'S NAME: Nicko Perez  MRN:   [id number]  :   1980  ACCT. NUMBER: [id number]  DATE OF SERVICE: 23  START TIME:  5:30 PM  END TIME:  8:30 PM  FACILITATOR(S): Maylin Guerrero LICSW  TOPIC: BEH Group Therapy  Number of patients attending the group:  5  Group Length:  3 Hours    Group Therapy Type: Addiction    Summary of Group / Topics Discussed:    Psychoeducation/Skills Stress Management and Addiction    Objective(s):    Identify 2 ways stress is connected to relapse in addiction    Identify 3 ways stress affects physical and mental health    Identify 3 ways to manage stress    Identify 1 healthy daily routine to commit to starting this week.     Structure (modalities, homework, worksheets, etc.):    Stress Management Handout   o Definition of Stress  o Importance of self-awareness to warning signs of stress  o Stress reduction in recovery  o Skills used to reduce stress and counteract its impact    Demonstration and practice of deep breathing exercise     Demonstration and practice of Mindfulness     Demonstration and practice of Progressive Relaxation exercise     Demonstration of Aromatherapy inhalers    Expected therapeutic outcome(s):     Decreased frequency and intensity of PAWS    Increased stamina and resiliency    Improved ability to function at optimal level     Therapeutic outcome(s) measured by:   Teachback and group review and evaluation    Video Visit:        Provider verified identity through the following two step process.  Patient provided:  Patient is known previously to provider and Patient was verified at admission/transfer     Telemedicine Visit: The patient's condition can be safely assessed and treated via synchronous audio and visual telemedicine encounter.      Reason for Telemedicine Visit: Services only offered telehealth.    Originating Site (Patient Location): Patient's home    Distant Site (Provider Location):  Provider [facility]  "Setting - Home Office     Consent:  The patient/guardian has verbally consented to: the potential risks and benefits of telemedicine (video visit) versus in person care; bill my insurance or make self-payment for services provided; and responsibility for payment of non-covered services.     Patient would like the video invitation sent by:  Other e-mail: [email or web address]    Mode of Communication:  Video Conference via Medical Zoom    Distant Location (Provider):  Off-site    As the provider I attest to compliance with applicable laws and regulations related to telemedicine.  Group Attendance:  Attended group session    Patient's response to the group topic/interactions:  did not discuss personal experience and discussed personal experience with topic    Patient appeared to be Actively participating and Attentive.        Client specific details:  Darrian shared that he enjoys being back working at the [employer] again and he is keeping his boundaries with his coworkers. He continues to do mindfulness and his stress is at 6 because his SO continues to use alcohol and become intoxicated around him. This is causing him to have daily cravings and apparently she leaves bottles around for him to see as well. We processed through options with him in group today. He enjoyed the mindfulness exercise today. WORKSHEET: Darrian said the most helpful part of today's topic was deep breathing and he named a benefit of deep breathing as \"it gives me peace of mind.\" The stress reduction technique chosen to practice this week is a new mindfulness exercise that he could hear the instructions as he does it. I sent him the link to a body-scan exercise after group that I explained to him. This topic completes one of the six MH Skills Groups required under D3 of his Treatment Plan.          "

## 2023-02-07 NOTE — ADDENDUM NOTE
Encounter addended by: Dorothy Ulrich LADC on: 2/7/2023 5:44 PM   Actions taken: Charge Capture section accepted

## 2023-02-07 NOTE — GROUP NOTE
Group Therapy Documentation    PATIENT'S NAME: iNcko Perez  MRN:   [id number]  :   1980  ACCT. NUMBER: [id number]  DATE OF SERVICE: 23  START TIME:  3:00 PM  END TIME:  5:00 PM  FACILITATOR(S): Dorothy Ulrich LADC  TOPIC: BEH Group Therapy  Video Visit:      Provider verified identity through the following two step process.  Patient provided:  Patient is known previously to provider    Telemedicine Visit: The patient's condition can be safely assessed and treated via synchronous audio and visual telemedicine encounter.      Reason for Telemedicine Visit: Patient has requested telehealth visit    Originating Site (Patient Location): Patient's home    Distant Site (Provider Location): [facility] Outpatient Setting: [place]    Consent:  The patient/guardian has verbally consented to: the potential risks and benefits of telemedicine (video visit) versus in person care; bill my insurance or make self-payment for services provided; and responsibility for payment of non-covered services.     Patient would like the video invitation sent by:  Send to e-mail at: No e-mail address on record    Mode of Communication:  Video Conference via Medical Zoom    Distant Location (Provider):  On-site    As the provider I attest to compliance with applicable laws and regulations related to telemedicine. Number of patients attending the group:  8  Group Length:  2 Hours    Group Therapy Type: Addiction    Summary of Group / Topics Discussed:    Anger/Conflict Management , Balanced Lifestyle , Boundaries, Cognitive Therapy Techniques, Coping Skills/Lifestyle Managemet, Choices in Recovery, Leisure Exploration/Use of Leisure Time, Meditation/Breathing Exercises, Mindfulness, Proper Nutrition & Exercise, Self-Care Activities, Sleep Hygiene, and Symptom Management    Clients participated in 10 minute stress reducing, guided meditation and shared thoughts and experiences with the exercise. Clients then took  "turns sharing check-in questions which consist of updated progress and/or concerns relating to each of the six dimensions. Clients also identified any concerns they had and provided feedback and support to one another.         Group Attendance:  Attended group session    Patient's response to the group topic/interactions:  discussed personal experience with topic and unable to interrupt client    Patient appeared to be Actively participating, Attentive and Engaged.        Client specific details:  Client shared the following feedback regarding mindfulness exercise: \"Pretty relaxing\".  He shared the following check in answers: No change in LDU. Reports the following biomedical issues/concerns: back is sore from working. States sleep is \"A little better\". Client has no medical appointments this week. He rates the following on a scale of 0-10 (10 being highest), Depression 5,  Anxiety 5. Client expresses experiencing the following mental health symptoms: Not feeling comfortable in my own house (GF drinking) and feeling hopelessness. He expresses coping by: Going to his parents for the night an, called counselor, and talked with family.  He rated his Motivation at an 8 out of 10 (10 being highest). Client rates cravings at 8-9 out of 10 (10 being highest). He reported the following triggers: Finding GF's empty liquor bottles, smelling vodka. He shared coping with triggers by Went to his parents for the night. Client did not attend a self help meeting this past week. He participated in the following recreational activities: Worked and watched movies. One thing he did for himself this week:Bought himself ribs. This group focused on goals relating to dim 3 (mindfulness activity) as well as related to discussing their progress and/or concerns relating to all 6 dimensions.       Plan: Client will meet with [facility] counselor on 2/8/23 for individual session and complete transition to phase II.    "

## 2023-02-08 ENCOUNTER — HOSPITAL ENCOUNTER (OUTPATIENT)
Dept: BEHAVIORAL HEALTH | Facility: CLINIC | Age: 43
Discharge: HOME | End: 2023-02-08
Attending: FAMILY MEDICINE
Payer: COMMERCIAL

## 2023-02-08 PROCEDURE — H2035 A/D TX PROGRAM, PER HOUR: HCPCS | Mod: HQ,GT,95

## 2023-02-08 PROCEDURE — H2035 A/D TX PROGRAM, PER HOUR: HCPCS | Mod: GT,95

## 2023-02-08 NOTE — PROGRESS NOTES
Individual Session Summary   START TIME: 10:00AM   END TIME: 11:08AM   Duration:  68 min    Video Visit:      Provider verified identity through the following two step process.  Patient provided:  Patient is known previously to provider    Telemedicine Visit: The patient's condition can be safely assessed and treated via synchronous audio and visual telemedicine encounter.      Reason for Telemedicine Visit: Patient has requested telehealth visit    Originating Site (Patient Location): Patient's home    Distant Site (Provider Location): [facility] Outpatient Setting: [place]    Consent:  The patient/guardian has verbally consented to: the potential risks and benefits of telemedicine (video visit) versus in person care; bill my insurance or make self-payment for services provided; and responsibility for payment of non-covered services.     Patient would like the video invitation sent by:  Send to e-mail at: [email or web address]    Mode of Communication:  Video Conference via Medical Zoom    Distant Location (Provider):  On-site    As the provider I attest to compliance with applicable laws and regulations related to telemedicine.    Data:  Met with client on this date for an individual session to update treatment plan, discuss progress and concerns, and complete requirements for client to transition to Phase II starting on 2/13/23 . He submitted to the folowing: PHQ-2 with a score of 2, LESLEY-2 with a score of 2, and PROMIS with a score of 28. We went through clients treatment plan strategies/goals which he expressed making progress on majority of them. We discussed additional assignments that would be beneficial for him as well as discussed the topic of his relationship which has lead to extreme highs and lows on an almost daily occasion. He continues to be ambivalent about ending/remaining in the relationship. He described her having good and bad qualities thus was asked to identify a pros and cons list  "to review individually with counselor during next 1 on 1 session. Client expressed \"Trying\" to practice mindfulness activities each day, however expresses difficulty to achieve that goal. We discussed clients work and sleep schedule and were able to identify a daily routine for meditation/mindfulness each day. This goal was then added to his treatment plan. Client was also informed that staff would like him to attend at least one group in-person per week. He was given contact information to establish medical rides through his insurance along with all details he would need to arrange a ride. Client expressed he would be attend Monday groups in person.    Intervention: Q and A, accountability, Treatment plan, MI, DBT, active listening, client centered, pros and cons.      Assessment: Client appeared coherent and awake. He was able to stay on task and was open to the suggestion of attending one group/wk in person.       NATASHA Cormier, [facility]  Plan. Client will establish rides to and from treatment every Monday for group, work on new treatment assignments, and transition to phase II on 2/13/23.  "

## 2023-02-09 NOTE — GROUP NOTE
Group Therapy Documentation    PATIENT'S NAME: Nicko Perez  MRN:   [id number]  :   1980  ACCT. NUMBER: [id number]  DATE OF SERVICE: 23  START TIME:  3:00 PM  END TIME:  5:00 PM  FACILITATOR(S): Dorothy Ulrich LADC  TOPIC: BEH Group Therapy  Video Visit:      Provider verified identity through the following two step process.  Patient provided:  Patient is known previously to provider    Telemedicine Visit: The patient's condition can be safely assessed and treated via synchronous audio and visual telemedicine encounter.      Reason for Telemedicine Visit: Patient has requested telehealth visit    Originating Site (Patient Location): Patient's home    Distant Site (Provider Location): [facility] Outpatient Setting: [place]    Consent:  The patient/guardian has verbally consented to: the potential risks and benefits of telemedicine (video visit) versus in person care; bill my insurance or make self-payment for services provided; and responsibility for payment of non-covered services.     Patient would like the video invitation sent by:  Send to e-mail at: No e-mail address on record    Mode of Communication:  Video Conference via Medical Zoom    Distant Location (Provider):  On-site    As the provider I attest to compliance with applicable laws and regulations related to telemedicine. Number of patients attending the group:  9  Group Length:  2 hours    Group Therapy Type: Addiction, Life skill(s), and Skills/Education    Summary of Group / Topics Discussed:    Anger/Conflict Management , Coping Skills/Lifestyle Managemet, Choices in Recovery, Journaling, Meditation/Breathing Exercises, Mindfulness, Proper Nutrition & Exercise, Self-Care Activities, Self-Esteem/Self [place], Stress Management, and Symptom Management    Clients took the first part of group to introduce themselves and welcome a new peer. Counselor then asked for a volunteer to describe what Mindfulness is, to their new  peer. We did a brief overview of the What and How skills of mindfulness making sure to explain the importance trying new things vs only passing judgements. We then listened to a 10 minute urge surfing guided meditation. Clients were given a short introduction to the concept of urge surfing and explained we would discuss this more as part of tomorrows group topic. Clients shared their feedback relating to the mindfulness activity. Clients were then asked who had completed assignments to share as well as were informed of a time management strategy we would be using today in order to make time for multiple clients to be able to share. Clients then took turns sharing their completed assignments as well as were given feedback after they were done presenting. At the end of group, counselor invited any clients that had any concerns they wanted to discuss, to stay on to talk.           Group Attendance:  Attended group session    Patient's response to the group topic/interactions:  cooperative with task and gave appropriate feedback to peers    Patient appeared to be Actively participated, except for when he fell asleep.     Client specific details: Client participated in welcoming new peer to the group. He also participated in meditation however client fell asleep and did not wake up when cued. He woke up and returned to group after 25-30 min. Due to client falling asleep, he did not share feedback about the mindfulness activity. He was going to present his assigment on emotional boundaries however falling asleep led to him not having time to present today. He listened to fellow peers as they shared their assignments and provided positive feedback to others. Client will still receive credit for 2 hours of group as he did participate in over 31min each hour. This group focused on goals identified in dim 3 (mindfulness activity and review on the how and what skills of mindfulness) and dim 4 (sharing completed treatment  assignments.    Plan: Client will share his emotional boundaries during next homework share group. Counselor will also discuss clients falling asleep and review his sleeping pattern and goals.   .

## 2023-02-09 NOTE — ADDENDUM NOTE
Encounter addended by: Dorothy Ulrich LADC on: 2/9/2023 1:38 PM   Actions taken: Charge Capture section accepted

## 2023-02-12 ENCOUNTER — HEALTH MAINTENANCE LETTER (OUTPATIENT)
Age: 43
End: 2023-02-12

## 2023-02-13 ENCOUNTER — HOSPITAL ENCOUNTER (OUTPATIENT)
Dept: BEHAVIORAL HEALTH | Facility: CLINIC | Age: 43
Discharge: HOME | End: 2023-02-13
Attending: FAMILY MEDICINE
Payer: COMMERCIAL

## 2023-02-13 PROCEDURE — H2035 A/D TX PROGRAM, PER HOUR: HCPCS | Mod: HQ,GT,95

## 2023-02-13 NOTE — TELEPHONE ENCOUNTER
----- Message from ELVIS Cormier sent at 2/13/2023 12:41 PM CST -----  Regarding: Scheduling  Nicko will be transitioning to phase 2 on 2/16/23. Please update his appointment schedule to reflect this change. Contact me if you have any questions, thank you :)    NICKO LA [[id number]]  Location of program: [place]  Start Date: 2/16/23  Time: M, T, TH  Provider: [facility] Mixed Phase II ([id number])  Number of visits: 20   Reason for visit: Group sessions  Length of appt: 120 minutes   Visit type:  Hybrid (In-person on Mondays and Zoom on Wednesdays and Thursdays)  Billing Type: part of program     Thank You!  Dorothy Ulrich

## 2023-02-14 ENCOUNTER — HOSPITAL ENCOUNTER (OUTPATIENT)
Dept: BEHAVIORAL HEALTH | Facility: CLINIC | Age: 43
Discharge: HOME | End: 2023-02-14
Attending: FAMILY MEDICINE
Payer: COMMERCIAL

## 2023-02-14 PROCEDURE — H2035 A/D TX PROGRAM, PER HOUR: HCPCS | Mod: HQ,GT,95 | Performed by: SOCIAL WORKER

## 2023-02-14 NOTE — GROUP NOTE
"Group Therapy Documentation    PATIENT'S NAME: Nicko Perez  MRN:   [id number]  :   1980  ACCT. NUMBER: [id number]  DATE OF SERVICE: 23  START TIME:  3:00 PM  END TIME:  5:00 PM  FACILITATOR(S): Dorothy Ulrich LADC  TOPIC: BEH Group Therapy  Video Visit:      Provider verified identity through the following two step process.  Patient provided:  Patient is known previously to provider    Telemedicine Visit: The patient's condition can be safely assessed and treated via synchronous audio and visual telemedicine encounter.      Reason for Telemedicine Visit: Patient has requested telehealth visit    Originating Site (Patient Location): Patient's home    Distant Site (Provider Location): [facility] Outpatient Setting: [place]    Consent:  The patient/guardian has verbally consented to: the potential risks and benefits of telemedicine (video visit) versus in person care; bill my insurance or make self-payment for services provided; and responsibility for payment of non-covered services.     Patient would like the video invitation sent by:  Send to e-mail at: No e-mail address on record    Mode of Communication:  Video Conference via Medical Zoom    Distant Location (Provider):  On-site    As the provider I attest to compliance with applicable laws and regulations related to telemedicine. Number of patients attending the group:  8    Group Length:  2 Hours    Group Therapy Type: Addiction, Life skill(s), and Skills/Education    Summary of Group / Topics Discussed:    Balanced Lifestyle , Boundaries, Coping Skills/Lifestyle Managemet, Choices in Recovery, Interpersonal Effectiveness, Leisure Exploration/Use of Leisure Time, Mindfulness, Proper Nutrition & Exercise, Self-Care Activities, Self-Esteem/Self [place], Sleep Hygiene, and Symptom Management    Clients participated in mindfulness activity in which they needed to use the how and what skills of mindfulness to complete \"A stroop test\", in " "which clients were shown a video, challenging them to identify the color of the word not the name of the word. The exercise progressed in speed which increasing the difficulty of it. Clients shared feedback after each time as they were asked to repeat the activity three times in order to get clients to see that what we practice grows stronger. Counselor then had clients switch it up and instead of saying the color of the word, they were to read the word. Clients then reflected on their experience with switching back to what was most comfortable for them. We talked about our habits and how we have practiced them over and over again and how difficult and uncomfortable change is, however, it is possible as long as we dont give up. Clients then took turns answering their check-in questions relating to sharing personal updates and/or concerns in each of the six dimensions. Clients ended group by sharing one goal to strive towards in the next 24 hours. This group related to goals in dimension 3 (mindfulness activity), dimension 4 (increasing motivation for change) as well as touched on all other dimensions in regards to the discussing check-ins.           Group Attendance:  Attended group session    Patient's response to the group topic/interactions:  cooperative with task, discussed personal experience with topic, expressed understanding of topic and listened actively    Patient appeared to be Actively participating, Attentive and Engaged.        Client specific details:  Client expressed enjoying the mindfulness activity adding, \"It clicked more when we were reading the word vs the color.. It clicked more\". He stated this activity helped him to stay in the moment. He shared the following check in answers: No change is LDU. Client denies any biomedical issues/concerns. However his sleeping habits have been an obstacle thus far. Client has no medical appointments this week. He rates the following on a scale of 0-10 (10 " being highest), Depression 2,  Anxiety 6-7. Client expresses experiencing the following mental health symptoms: Irritable, tense, and frustrated relating to trying to quit smoking. He expresses coping by: Watching funny videos and doing meditations he finds on You Tube. He rated his Motivation at an 8 out of 10 (10 being highest). Client rates cravings at 1-2. out of 10 (10 being highest). He reported triggers being:Super Bowl which he would typically drink during and the beer commercials.  He coped by Turning the game off and practicing breathing exercises. Client reports attending two self help meetings this past week. He participated in the following recreational activities: Going to meetings. One thing he did for himself this week: Going to meetings. No Concerns reported at this time. Client identified one thing he would like to accomplish in the next 24 hours being: To not have a cigarette.    Plan: Client will accomplish the task he identified. Counselor will email client information relating to smoking cessation as well as contact his PCP in regards to possible nicotine cessation medications/options. Client was suppose to attend group in person today however stated he did not have a ride. He stated he would be here in person next Monday.

## 2023-02-14 NOTE — GROUP NOTE
Group Therapy Documentation    PATIENT'S NAME: Nicko Perez  MRN:   [id number]  :   1980  ACCT. NUMBER: [id number]  DATE OF SERVICE: 23  START TIME:  3:00 PM  END TIME:  6:00 PM  FACILITATOR(S): Maylin Guerrero LICSW  TOPIC: BEH Group Therapy  Number of patients attending the group:  4  Group Length:  3 Hours    Group Therapy Type: Addiction    Summary of Group / Topics Discussed:    Psychoeducation/Skills How Neurobiology Affects Behavior (IOP)  This topic will give a general overview of the neurobiology of addiction with the purpose of teaching new brain-based coping strategies to reduce the impact of their cravings, urges and distorted memories.  The clients will learn how the midbrain uses memory and associations to create cravings and how to counteract these.     Objective(s):    Clients will identify 3 ways to calm their overactive midbrain and strengthen their frontal cortex to lessen the impact of cravings and urges for addictive substances.      Clients will identify the underlying mechanisms at work to help them detach from the thoughts and emotions that trigger using.     Describe the reward pathway involved in addiction    Identify 1 skill  to counteract cravings and urges to use substances    Structure (modalities, homework, worksheets, etc.):    History of the study of addiction as a disease (PowerPoint)    Psychoeducation on the areas of the midbrain involved in addiction and how the various parts of the brain communicate with each other (PowerPoint)    Psychoeducation and discussion on how the escalation of addiction manifests in personal behaviors leading to negative consequences     Hands on practice of evidenced based strategies to calm the midbrain     Hands on practice of evidenced based strategies to strengthen the frontal cortex    Psychoeducation of anatomy of cravings, urges and addictive thinking and how to counteract this pattern     Show video clip of cross-addictions &  "process addictions    Expected therapeutic outcome(s):     A reduction in shame and guilt due to understanding how addiction influences behavior.    Reduction of use episodes due to using skills to reduce cravings.    Therapeutic outcome(s) measured by:   Teachback and group review and evaluation form    Video Visit:        Provider verified identity through the following two step process.  Patient provided:  Patient is known previously to provider and Patient was verified at admission/transfer     Telemedicine Visit: The patient's condition can be safely assessed and treated via synchronous audio and visual telemedicine encounter.      Reason for Telemedicine Visit: Services only offered telehealth.    Originating Site (Patient Location): Patient's home    Distant Site (Provider Location):  Provider Remote Setting - Home Office     Consent:  The patient/guardian has verbally consented to: the potential risks and benefits of telemedicine (video visit) versus in person care; bill my insurance or make self-payment for services provided; and responsibility for payment of non-covered services.     Patient would like the video invitation sent by:  Other e-mail: [email or web address]    Mode of Communication:  Video Conference via Medical Zoom    Distant Location (Provider):  Off-site    As the provider I attest to compliance with applicable laws and regulations related to telemedicine.    Group Attendance:  Attended group session    Patient's response to the group topic/interactions:  expressed readiness to alter behaviors and expressed understanding of topic    Patient appeared to be Attentive and Engaged.        Client specific details:  Nicko shared that his week is going \"pretty well\" and he is less stressed this week because his SO is not drinking around him as much and has started to go to an AA Speaker meeting with a friend. He stays busy at work and continues to use you tube clips for mindfulness practice. His " "stress is 2-3 \"mainly due to trying to quit smoking\" and was praised for this. He had few cravings this week. He liked the [employer] Talk and learning about \"process addictions.\" WORKSHEET: Nicko named the most important part of today's topic as \"The survival part of our brain, tricking us to use and how to avoid this.\" He named one part of the brain that was involved in addiction as the midbrain. Nicko felt this education was helpful to their recovery because \"It shows how to slow myself down and not act on impulse.\" This topic completes one of the six MH Skills Groups required under D3 of his Treatment Plan.          "

## 2023-02-14 NOTE — ADDENDUM NOTE
Encounter addended by: Dorothy Ulrich LADC on: 2/14/2023 10:28 AM   Actions taken: Charge Capture section accepted

## 2023-02-15 ENCOUNTER — HOSPITAL ENCOUNTER (OUTPATIENT)
Dept: BEHAVIORAL HEALTH | Facility: CLINIC | Age: 43
Discharge: HOME | End: 2023-02-15
Attending: FAMILY MEDICINE
Payer: COMMERCIAL

## 2023-02-15 PROCEDURE — H2035 A/D TX PROGRAM, PER HOUR: HCPCS | Mod: HQ,GT,95
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
## 2023-02-15 NOTE — GROUP NOTE
"Group Therapy Documentation    PATIENT'S NAME: Nicko Perez  MRN:   [id number]  :   1980  ACCT. NUMBER: [id number]  DATE OF SERVICE: 2/15/23  START TIME:  3:00 PM  END TIME:  5:00 PM  FACILITATOR(S): Dorothy Ulrich LADC  TOPIC: BEH Group Therapy  Video Visit:      Provider verified identity through the following two step process.  Patient provided:  Patient is known previously to provider    Telemedicine Visit: The patient's condition can be safely assessed and treated via synchronous audio and visual telemedicine encounter.      Reason for Telemedicine Visit: Patient has requested telehealth visit    Originating Site (Patient Location): Patient's home    Distant Site (Provider Location): [facility] Outpatient Setting: [place]    Consent:  The patient/guardian has verbally consented to: the potential risks and benefits of telemedicine (video visit) versus in person care; bill my insurance or make self-payment for services provided; and responsibility for payment of non-covered services.     Patient would like the video invitation sent by:  Send to e-mail at: No e-mail address on record    Mode of Communication:  Video Conference via Medical Zoom    Distant Location (Provider):  On-site    As the provider I attest to compliance with applicable laws and regulations related to telemedicine. Number of patients attending the group:  6  Group Length:  2 Hours    Group Therapy Type: Life skill(s) and Skills/Education    Summary of Group / Topics Discussed:    Boundaries, Coping Skills/Lifestyle Managemet, Forgiveness, Mindfulness, and Relationships    Counselor started group off by reading a daily meditation for today titled, \"Control\" and sharing feedback. They then participated in a mindfulness activity regarding listening to different familiar sounds and guessing what the sound was and shared feedback. Clients answered the following check-in questions: Identify two feelings (which they were " "asked to use a feelings chart to help them identify more specific secondary feelings/emotions. Did you complete the goal you set for yourself during Mondays group? One affirmation and one thing they appreciate? Clients then took turns sharing completed treatment assignments.       Group Attendance:  Attended group session    Patient's response to the group topic/interactions:  cooperative with task, discussed personal experience with topic, expressed understanding of topic and listened actively    Patient appeared to be Actively participating, Attentive and Engaged.        Client specific details:  Client shared the following feedback relating to reading on \"Control\": \"I am not the type of person that takes control\" adding that he does not want the responsibility that comes with it. He shared the mindfulness activity \"was pretty good\" adding that he notice himself drifting off.  He shared the following answers to the check in questions: Irritated at girlfriend for setting multiple alarms in the morning and motivated sharing he got a lot of chores done today. He stated completing his goal. One affirmation:I am motivating.  No concerns mentioned at this time. Client shared his assignment on asserive communication however did not appear to understand the directions as well as shared that he does not feel he needs to work on being more assertive. Counselor expressed we can discuss a replacement assignment, which client agreed to. This subject appeared to be relevant to other clients wanting/needing to gain insight into boundaries. This group relating to goal in dimension 3 (mindfulness activity), dimension 4 (discussing control and sharing completed treatment assignments.     Plan: Client will discuss additional topics with client to identify new treatment assignments. We will also be discussing communication styles and boundaries during group tomorrow.     "

## 2023-02-16 ENCOUNTER — HOSPITAL ENCOUNTER (OUTPATIENT)
Dept: BEHAVIORAL HEALTH | Facility: CLINIC | Age: 43
Discharge: HOME | End: 2023-02-16
Attending: FAMILY MEDICINE
Payer: COMMERCIAL

## 2023-02-16 PROCEDURE — H2035 A/D TX PROGRAM, PER HOUR: HCPCS | Mod: HQ,GT,95

## 2023-02-16 NOTE — ADDENDUM NOTE
Encounter addended by: Dorothy Ulrich LADC on: 2/15/2023 6:34 PM   Actions taken: Charge Capture section accepted

## 2023-02-16 NOTE — GROUP NOTE
Group Therapy Documentation    PATIENT'S NAME: Nicko Perez  MRN:   [id number]  :   1980  ACCT. NUMBER: [id number]  DATE OF SERVICE: 23  START TIME:  3:00 PM  END TIME:  5:00 PM  FACILITATOR(S): Dorothy Ulrich LADC  TOPIC: BEH Group Therapy  Video Visit:      Provider verified identity through the following two step process.  Patient provided:  Patient is known previously to provider    Telemedicine Visit: The patient's condition can be safely assessed and treated via synchronous audio and visual telemedicine encounter.      Reason for Telemedicine Visit: Patient has requested telehealth visit    Originating Site (Patient Location): Patient's home    Distant Site (Provider Location): [facility] Outpatient Setting: [place]    Consent:  The patient/guardian has verbally consented to: the potential risks and benefits of telemedicine (video visit) versus in person care; bill my insurance or make self-payment for services provided; and responsibility for payment of non-covered services.     Patient would like the video invitation sent by:  Send to e-mail at: No e-mail address on record    Mode of Communication:  Video Conference via Medical Zoom    Distant Location (Provider):  On-site    As the provider I attest to compliance with applicable laws and regulations related to telemedicine.     Number of patients attending the group:  9  Group Length:  2 Hours    Group Therapy Type: Life skill(s) and Skills/Education    Summary of Group / Topics Discussed:    Anger/Conflict Management , Balanced Lifestyle , Boundaries, Communication, Coping Skills/Lifestyle Managemet, Leisure Exploration/Use of Leisure Time, Mindfulness, Relationships, and Self-Care Activities    Topic: Healthy vs Unhealthy Relations/Boundaries     This topic will discuss what are boundaries, types of boundaries, healthy and unhealthy characteristics of boundaries, and how to set boundaries with yourself and others.      Clients participated in a 12 minute standing stretching mindfulness activity and shared feedback. Clients took turns answering today's check in questions: Any use episodes? One strength? One weakness? One interested thing that has happened since last group? One goal for the weekend? Clients listened while a fellow peer shared his assignment from yesterdays group as well as shared one example of a refuse skill they have used or could use. Client then gained took turns reading about each of the 6 styles of boundaries as well as were asked to identify any unhealthy boundary traits they may have or have had. Clients ended group  By sharing which of the 6 styles of boundaries they have healthy boundaries in and which they have unhealthy boundaries.    Objective(s):  1. Patient will identify healthy and/or unhealthy characteristics that relate to their life's.   2. Patient will be able to explain the differences between [place], rigid and healthy boundaries.  3. Patient will identify the which boundaries  types they have healthy limits set and which ones they have unhealthy limits set.      Structure (modalities, homework, worksheets, etc)   1. Patient will participate in group discussion about what boundaries look like and role play scenarios of healthy and unhealthy boundaries.  2. Use teach-back techniques to ensure patients understanding.  3. Patient will be utilizing handouts to enhance learning.     Expected therapeutic outcome(s):  Patient will:  1. Be able to identify current or potential unhealthy boundaries  2. Be able to improve and set healthy boundaries to support themselves and their recovery.      Therapeutic outcome(s) measured by:   Clients openly expressing their common unhealthy boundaries and identifying ways in which they can improve on that boundary.           Group Attendance:  Attended group session    Patient's response to the group topic/interactions:  listened actively, unable to interrupt  "client and verbalizations were off topic    Patient appeared to be Actively participating and Attentive.        Client specific details:  Client shared the following feedback on stretching activity: \"I really liked it. I did get a little dizzy during one of the stretches. It helped to loosen up my leg muscles\". He shared the following answers to check in questions: No. Strong motivation. Passive communicator. Made meatloaf using turkey instead of beef.To get out and do something this weekend. Client identified having healthy physical boundaries relating to work. He acknowledges unhealthy emotional boundaries such as: Assuming knowing how other people feel. He identified lacking most on emotional boundaries and doing well with physical boundaries. This group focused on dim 3 goals (mindfulness activity), dim 4 (Sharing completed assignment) and dim 6 (setting boundaries).     Plan: Client will be mindful of any healthy or unhealthy boundaries over the weekend. We will further discuss emotional boundaries next week as this was an area majority of the clients struggle with, outside of treatment.         "

## 2023-02-17 NOTE — ADDENDUM NOTE
Encounter addended by: Dorothy Ulrich LADC on: 2/16/2023 6:52 PM   Actions taken: Charge Capture section accepted

## 2023-02-20 ENCOUNTER — HOSPITAL ENCOUNTER (OUTPATIENT)
Dept: BEHAVIORAL HEALTH | Facility: CLINIC | Age: 43
Discharge: HOME | End: 2023-02-20
Attending: FAMILY MEDICINE
Payer: COMMERCIAL

## 2023-02-20 PROCEDURE — H2035 A/D TX PROGRAM, PER HOUR: HCPCS | Mod: HQ,GT,95

## 2023-02-20 NOTE — GROUP NOTE
Group Therapy Documentation    PATIENT'S NAME: Nicko Perez  MRN:   [id number]  :   1980  ACCT. NUMBER: [id number]  DATE OF SERVICE: 23  START TIME:  3:00 PM  END TIME:  5:00 PM  FACILITATOR(S): Dorothy Ulrich LADC  TOPIC: BEH Group Therapy  Video Visit:      Provider verified identity through the following two step process.  Patient provided:  Patient is known previously to provider    Telemedicine Visit: The patient's condition can be safely assessed and treated via synchronous audio and visual telemedicine encounter.      Reason for Telemedicine Visit: Patient has requested telehealth visit    Originating Site (Patient Location): Patient's home    Distant Site (Provider Location): [facility] Outpatient Setting: [place]    Consent:  The patient/guardian has verbally consented to: the potential risks and benefits of telemedicine (video visit) versus in person care; bill my insurance or make self-payment for services provided; and responsibility for payment of non-covered services.     Patient would like the video invitation sent by:  Send to e-mail at: No e-mail address on record    Mode of Communication:  Video Conference via Medical Zoom    Distant Location (Provider):  On-site    As the provider I attest to compliance with applicable laws and regulations related to telemedicine.     Number of patients attending the group:  7  Group Length:  2 Hours    Group Therapy Type: Life skill(s) and Skills/Education    Summary of Group / Topics Discussed:    Anger/Conflict Management , Balanced Lifestyle , Communication, Coping Skills/Lifestyle Managemet, Choices in Recovery, Leisure Exploration/Use of Leisure Time, Meditation/Breathing Exercises, Mindfulness, Proper Nutrition & Exercise, Relaxation Techniques, Self-Care Activities, and Sleep Hygiene    Clients participated in a 6 minute guided body scan meditation and took turns sharing feedback and reflecting on their experience. Clients  "took turns answering check-in questions consisting of identifying progress and/or concerns relating to each of the six dimensions. Counselor ended group by informing clients she will be having some time off starting this Thursday. Clients were asked if there is anything they would like counselor to discuss with the covering counselor before he takes over. Counselor also discussed the change of having homework share group on Thursday this week instead of Wednesday so we can continue our discussion on emotional boundaries. Clients were explained they would be emailed a values assessment that is to be completed prior to the start of group on Wednesday as we are going to be using the results to help us set healthy personal boundaries. Clients denied having any questions and agreed to have their assessments completed.       Group Attendance:  Attended group session    Patient's response to the group topic/interactions:  cooperative with task, discussed personal experience with topic, listened actively and unable to interrupt client    Patient appeared to be Actively participating, Attentive and Engaged.        Client specific details:  Client shared the following feedback relating to mindfulness exercise: \"I liked it however I couldnt hear as the video went on\" adding he focused on his breathing at that point. He shared the following check in answers: No change is LDU. Endorses the following biomedical issues/concerns: None. Client has no medical appointments this week. He rates the following on a scale of 0-10 (10 being highest), Depression 0  Anxiety 3-4.  Client expresses experiencing the following mental health symptoms: Exhausted yet proud both relating to work situations.  He expresses coping by:Working hard and practicing self care.  He rated his Motivation at an 7-8 out of 10 (10 being highest). Client rates cravings at 1-2 out of 10 (10 being highest). He reports two triggers being: When he goes on a smoke break " at work, their is a [employer] liqour in sight and boredom. He coped by Going back to work. Client attended two AA meetings. He participated in the following recreational activities: Watched HBO max and Show time. One thing he did for himself this week: Watched HBO max and Show time.  No Concerns reported at this time. This group focused on goals in dimension 3 (mindfulness activity) as well as discussed updates relating to all six dimensions during check in.     Plan: Counselor will email clients the values assessment discussed. They will complete their assessments by group time Wednesday to help resource to during next group topic: Emotional boundaries.

## 2023-02-20 NOTE — PROGRESS NOTES
Individual Session Summary   START TIME: 1:00PM   END TIME: 2:00PM   Duration: 1 Hour    Video Visit:      Provider verified identity through the following two step process.  Patient provided:  Patient is known previously to provider    Telemedicine Visit: The patient's condition can be safely assessed and treated via synchronous audio and visual telemedicine encounter.      Reason for Telemedicine Visit: Patient has requested telehealth visit    Originating Site (Patient Location): Patient's home    Distant Site (Provider Location): [facility] Outpatient Setting: [place]    Consent:  The patient/guardian has verbally consented to: the potential risks and benefits of telemedicine (video visit) versus in person care; bill my insurance or make self-payment for services provided; and responsibility for payment of non-covered services.     Patient would like the video invitation sent by:  Send to e-mail at: [email or web address]    Mode of Communication:  Video Conference via Medical Zoom    Distant Location (Provider):  On-site    As the provider I attest to compliance with applicable laws and regulations related to telemedicine.    Data:  Met with client on this date for an individual session to check in and update/review treatment plan goals and strategies. Client expressed experiencing appetite changes adding he continues to feel hungry and cannot get full. He relates this symptom with PAWs.  He discussed he has decreased his coffee intake however does not feel that has been affecting his sleep, one way or another. Client expresses wanting to schedule an appointment with PCP for medication management, (We added this goal on his treatment plan). He endorses work has been going  Pretty good  even with a fellow co-worker calling in multiple times to day.  We discussed clients support network and he was asked who is able to communicate openly with. He stated he can talk to his Dad about his relationship  however, client laughed when I asked him if he can talk to his Dad about his feelings and thoughts. We discussed boundaries relating to taking care of himself relating to his relationship with his girlfriend. Client stated he has not made a pros and cons list yet, however shared he will have it completed before group on 2/22/23. Client also stated he that there is not way he will be able to attend groups in person due to transportation issues and that it would be a 2 hour bus ride and cost $30 to use public transportation. Client was challenged to think about other ways in which to hold him accountable while in the program. He agreed to do so.     Intervention: Q and A, MI, boundaries and effective communication skills, treatment plan, treatment assignments.      Assessment: Client continues to display inconsistencies in regards to which life skill areas he is doing well in and which areas he is struggling with. Client promised last week that he would be in today's group, in person. However did not follow thru with his words.     Plan. Client will Identify ways in which to increase his accountability without coming to groups in person. He will complete pros and cons sheet as well as identifying 5 fun things he can do.     Dorothy Ulrich, BS, LADC

## 2023-02-21 NOTE — ADDENDUM NOTE
Encounter addended by: Dorothy Ulrich LADC on: 2/20/2023 6:37 PM   Actions taken: Charge Capture section accepted

## 2023-03-08 ENCOUNTER — HOSPITAL ENCOUNTER (OUTPATIENT)
Dept: BEHAVIORAL HEALTH | Facility: CLINIC | Age: 43
Discharge: HOME | End: 2023-03-08
Attending: FAMILY MEDICINE
Payer: COMMERCIAL

## 2023-03-08 ENCOUNTER — TELEPHONE (OUTPATIENT)
Dept: BEHAVIORAL HEALTH | Facility: CLINIC | Age: 43
End: 2023-03-08
Payer: COMMERCIAL

## 2023-03-08 PROCEDURE — H2035 A/D TX PROGRAM, PER HOUR: HCPCS | Mod: GT,95

## 2023-03-08 NOTE — TELEPHONE ENCOUNTER
----- Message from ELVIS Cormier sent at 3/8/2023  1:30 PM CST -----  Regarding: Scheduling  Please Schedule    SILKE [facility] [[id number]]  Location of program: [facility]  Date: 3/8/23  Time:12:30pm  [facility] PHASE 2 MIXED ([id number])    Visit type:  Zoom   Appointment Length: 60 min.   Appointment Reason: Individual  Billing Type: part of program     Thank You!  Dorothy Ulrich

## 2023-03-09 NOTE — PROGRESS NOTES
[facility]  [id number] [place] [address] [place] [address]           March 8, 2023        Nicko Perez  [facility]  [address] [place] [id number]  Phone: [phone]  Fax: [phone]       Dear Nicko Perez     This is to advise you that you have been discharged from the program effective 3/8/23 for reasons including but not limiting to your refusal to attend in-person groups, privacy concerns, refusal to comply to UA's within requested time frame, not being honest about use episodes, and failure to effectively communicate with program staff.  Please follow the recommendations below.     The following recommendations are being made:     Client will be monitored for withdrawal potential by medical personal and follow any/all recommendations.   Client will comply with any/all recommendations made by mental health professionals.  Seek medical attention for current biomedical issues/concerns and follow any/all recommendations of medical providers.   He will attend and complete a residential treatment program and follow any/all recommendations for aftercare.   Abstain from all mood-altering chemicals unless prescribed by a licensed provider.  He will take medications as prescribed   Remain law abiding.  Follow and complete all requirements of your probation.  Utilize your individual safety plan as needed as well as county crisis phone numbers.  If needed contact any of the crisis phone numbers below;  [place] on Mental Illness (www.[place].igno.org[place] 536.689.1623 or 610-530-4638.  [facility] Crisis Response [phone]  Suicide and Crisis Lifeline- 985        If we can be of further service, please don't hesitate to call.     Sincerely,     Dorothy Ulrich [facility]  Phone: [phone]
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
## 2023-03-09 NOTE — ADDENDUM NOTE
Encounter addended by: Dorothy Ulrich LADC on: 3/8/2023 8:11 PM   Actions taken: Clinical Note Signed

## 2023-03-10 NOTE — ADDENDUM NOTE
Encounter addended by: Dorothy Ulrich LADC on: 3/10/2023 2:49 PM   Actions taken: Clinical Note Signed
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
## 2023-03-13 NOTE — ADDENDUM NOTE
Encounter addended by: Dorothy Ulrich LADC on: 3/12/2023 9:44 PM   Actions taken: Pend clinical note, Clinical Note Signed

## 2024-03-10 ENCOUNTER — HEALTH MAINTENANCE LETTER (OUTPATIENT)
Age: 44
End: 2024-03-10

## 2025-03-16 ENCOUNTER — HEALTH MAINTENANCE LETTER (OUTPATIENT)
Age: 45
End: 2025-03-16